# Patient Record
Sex: FEMALE | Race: WHITE | Employment: OTHER | URBAN - METROPOLITAN AREA
[De-identification: names, ages, dates, MRNs, and addresses within clinical notes are randomized per-mention and may not be internally consistent; named-entity substitution may affect disease eponyms.]

---

## 2017-01-23 ENCOUNTER — HOSPITAL ENCOUNTER (OUTPATIENT)
Dept: GENERAL RADIOLOGY | Facility: HOSPITAL | Age: 72
Discharge: HOME | End: 2017-01-23
Attending: INTERNAL MEDICINE
Payer: MEDICARE

## 2017-01-23 DIAGNOSIS — R05.9 COUGH: ICD-10-CM

## 2017-01-23 DIAGNOSIS — F17.200 SMOKER: ICD-10-CM

## 2017-01-23 PROCEDURE — 71020 XR CHEST PA + LAT CHEST (CPT=71020): CPT

## 2017-04-03 PROBLEM — H43.811 POSTERIOR VITREOUS DETACHMENT, RIGHT: Status: ACTIVE | Noted: 2017-04-03

## 2017-04-03 PROBLEM — H25.13 NUCLEAR SCLEROTIC CATARACT, BILATERAL: Status: ACTIVE | Noted: 2017-04-03

## 2017-04-03 PROBLEM — H26.9 CORTICAL CATARACT OF BOTH EYES: Status: ACTIVE | Noted: 2017-04-03

## 2018-02-26 ENCOUNTER — HOSPITAL ENCOUNTER (OUTPATIENT)
Dept: GENERAL RADIOLOGY | Facility: HOSPITAL | Age: 73
Discharge: HOME | End: 2018-02-26
Attending: INTERNAL MEDICINE
Payer: MEDICARE

## 2018-02-26 ENCOUNTER — LAB ENCOUNTER (OUTPATIENT)
Dept: LAB | Facility: HOSPITAL | Age: 73
End: 2018-02-26
Attending: INTERNAL MEDICINE
Payer: MEDICARE

## 2018-02-26 DIAGNOSIS — R05.9 COUGH: ICD-10-CM

## 2018-02-26 DIAGNOSIS — R05.9 COUGH: Primary | ICD-10-CM

## 2018-02-26 LAB — PROCALCITONIN SERPL-MCNC: 0.32 NG/ML (ref ?–0.11)

## 2018-02-26 PROCEDURE — 71046 X-RAY EXAM CHEST 2 VIEWS: CPT | Performed by: INTERNAL MEDICINE

## 2018-02-26 PROCEDURE — 36415 COLL VENOUS BLD VENIPUNCTURE: CPT

## 2018-02-26 PROCEDURE — 84145 PROCALCITONIN (PCT): CPT

## 2018-07-02 PROBLEM — H25.13 NUCLEAR SCLEROTIC CATARACT, BILATERAL: Status: RESOLVED | Noted: 2017-04-03 | Resolved: 2018-07-02

## 2018-07-02 PROBLEM — H43.813 POSTERIOR VITREOUS DETACHMENT OF BOTH EYES: Status: ACTIVE | Noted: 2018-07-02

## 2018-07-02 PROBLEM — Z96.1 PSEUDOPHAKIA OF BOTH EYES: Status: ACTIVE | Noted: 2018-07-02

## 2018-07-02 PROBLEM — H26.9 CORTICAL CATARACT OF BOTH EYES: Status: RESOLVED | Noted: 2017-04-03 | Resolved: 2018-07-02

## 2018-07-02 PROBLEM — H43.811 POSTERIOR VITREOUS DETACHMENT, RIGHT: Status: RESOLVED | Noted: 2017-04-03 | Resolved: 2018-07-02

## 2018-09-26 ENCOUNTER — RT VISIT (OUTPATIENT)
Dept: RESPIRATORY THERAPY | Facility: HOSPITAL | Age: 73
End: 2018-09-26
Attending: INTERNAL MEDICINE
Payer: MEDICARE

## 2018-09-26 DIAGNOSIS — R06.00 DOE (DYSPNEA ON EXERTION): ICD-10-CM

## 2018-09-26 PROCEDURE — 94726 PLETHYSMOGRAPHY LUNG VOLUMES: CPT

## 2018-09-26 PROCEDURE — 94060 EVALUATION OF WHEEZING: CPT

## 2018-09-26 PROCEDURE — 94729 DIFFUSING CAPACITY: CPT

## 2018-09-28 ENCOUNTER — HOSPITAL ENCOUNTER (OUTPATIENT)
Dept: CV DIAGNOSTICS | Facility: HOSPITAL | Age: 73
Discharge: HOME | End: 2018-09-28
Attending: INTERNAL MEDICINE
Payer: MEDICARE

## 2018-09-28 ENCOUNTER — HOSPITAL ENCOUNTER (OUTPATIENT)
Dept: GENERAL RADIOLOGY | Facility: HOSPITAL | Age: 73
Discharge: HOME | End: 2018-09-28
Attending: INTERNAL MEDICINE
Payer: MEDICARE

## 2018-09-28 DIAGNOSIS — R06.00 DOE (DYSPNEA ON EXERTION): ICD-10-CM

## 2018-09-28 DIAGNOSIS — R09.89 RESPIRATORY CRACKLES AT RIGHT LUNG BASE: ICD-10-CM

## 2018-09-28 DIAGNOSIS — R01.1 MURMUR, CARDIAC: ICD-10-CM

## 2018-09-28 DIAGNOSIS — R60.9 EDEMA: ICD-10-CM

## 2018-09-28 DIAGNOSIS — R06.02 SOB (SHORTNESS OF BREATH): ICD-10-CM

## 2018-09-28 PROCEDURE — 93306 TTE W/DOPPLER COMPLETE: CPT | Performed by: INTERNAL MEDICINE

## 2018-09-28 PROCEDURE — 71046 X-RAY EXAM CHEST 2 VIEWS: CPT | Performed by: INTERNAL MEDICINE

## 2018-09-28 NOTE — PROCEDURES
Vin Jiménez is a 68-year-old  female who stands 5 feet 5 inches tall and weighs 137 pounds. She underwent standard pulmonary function testing on 9/26/18. She carries a diagnosis of \"dyspnea on exertion. \"  She admits to a [id number] smoking his

## 2019-01-15 ENCOUNTER — HOSPITAL ENCOUNTER (OUTPATIENT)
Dept: MAMMOGRAPHY | Age: 74
Discharge: HOME | End: 2019-01-15
Attending: INTERNAL MEDICINE
Payer: MEDICARE

## 2019-01-15 DIAGNOSIS — Z12.31 ENCOUNTER FOR SCREENING MAMMOGRAM FOR MALIGNANT NEOPLASM OF BREAST: ICD-10-CM

## 2019-01-15 PROCEDURE — 77067 SCR MAMMO BI INCL CAD: CPT | Performed by: INTERNAL MEDICINE

## 2019-01-15 PROCEDURE — 77063 BREAST TOMOSYNTHESIS BI: CPT | Performed by: INTERNAL MEDICINE

## 2019-03-04 ENCOUNTER — APPOINTMENT (OUTPATIENT)
Dept: CT IMAGING | Facility: HOSPITAL | Age: 74
DRG: 139 | End: 2019-03-04
Attending: EMERGENCY MEDICINE
Payer: MEDICARE

## 2019-03-04 ENCOUNTER — HOSPITAL ENCOUNTER (INPATIENT)
Facility: HOSPITAL | Age: 74
LOS: 1 days | Discharge: HOME | DRG: 139 | End: 2019-03-07
Attending: EMERGENCY MEDICINE | Admitting: INTERNAL MEDICINE
Payer: MEDICARE

## 2019-03-04 DIAGNOSIS — L02.11 NECK ABSCESS: ICD-10-CM

## 2019-03-04 DIAGNOSIS — K11.3 ABSCESS OF PAROTID GLAND: Primary | ICD-10-CM

## 2019-03-04 DIAGNOSIS — K11.21 ACUTE PAROTITIS: ICD-10-CM

## 2019-03-04 LAB
ALBUMIN SERPL-MCNC: 4.2 G/DL (ref 3.4–5)
ALBUMIN/GLOB SERPL: 1.5 {RATIO} (ref 1–2)
ALP LIVER SERPL-CCNC: 84 U/L (ref 55–142)
ALT SERPL-CCNC: 17 U/L (ref 13–56)
ANION GAP SERPL CALC-SCNC: 10 MMOL/L (ref 0–18)
APTT PPP: 29.3 SECONDS (ref 26.1–34.6)
AST SERPL-CCNC: 16 U/L (ref 15–37)
BASOPHILS # BLD AUTO: 0.03 X10(3) UL (ref 0–0.2)
BASOPHILS NFR BLD AUTO: 0.3 %
BILIRUB SERPL-MCNC: 0.9 MG/DL (ref 0.1–2)
BUN BLD-MCNC: 10 MG/DL (ref 7–18)
BUN/CREAT SERPL: 16.1 (ref 10–20)
CALCIUM BLD-MCNC: 9.1 MG/DL (ref 8.5–10.1)
CHLORIDE SERPL-SCNC: 107 MMOL/L (ref 98–107)
CO2 SERPL-SCNC: 25 MMOL/L (ref 21–32)
CREAT BLD-MCNC: 0.62 MG/DL (ref 0.55–1.02)
DEPRECATED RDW RBC AUTO: 43.3 FL (ref 35.1–46.3)
EOSINOPHIL # BLD AUTO: 0.1 X10(3) UL (ref 0–0.7)
EOSINOPHIL NFR BLD AUTO: 0.9 %
ERYTHROCYTE [DISTWIDTH] IN BLOOD BY AUTOMATED COUNT: 11.9 % (ref 11–15)
GLOBULIN PLAS-MCNC: 2.8 G/DL (ref 2.8–4.4)
GLUCOSE BLD-MCNC: 90 MG/DL (ref 70–99)
HCT VFR BLD AUTO: 43.2 % (ref 35–48)
HGB BLD-MCNC: 14.6 G/DL (ref 12–16)
IMM GRANULOCYTES # BLD AUTO: 0.03 X10(3) UL (ref 0–1)
IMM GRANULOCYTES NFR BLD: 0.3 %
INR BLD: 0.97 (ref 0.9–1.1)
LYMPHOCYTES # BLD AUTO: 1.58 X10(3) UL (ref 1–4)
LYMPHOCYTES NFR BLD AUTO: 14.5 %
M PROTEIN MFR SERPL ELPH: 7 G/DL (ref 6.4–8.2)
MCH RBC QN AUTO: 33 PG (ref 26–34)
MCHC RBC AUTO-ENTMCNC: 33.8 G/DL (ref 31–37)
MCV RBC AUTO: 97.7 FL (ref 80–100)
MONOCYTES # BLD AUTO: 0.74 X10(3) UL (ref 0.1–1)
MONOCYTES NFR BLD AUTO: 6.8 %
NEUTROPHILS # BLD AUTO: 8.39 X10 (3) UL (ref 1.5–7.7)
NEUTROPHILS # BLD AUTO: 8.39 X10(3) UL (ref 1.5–7.7)
NEUTROPHILS NFR BLD AUTO: 77.2 %
OSMOLALITY SERPL CALC.SUM OF ELEC: 293 MOSM/KG (ref 275–295)
PLATELET # BLD AUTO: 164 10(3)UL (ref 150–450)
POTASSIUM SERPL-SCNC: 3.7 MMOL/L (ref 3.5–5.1)
PSA SERPL DL<=0.01 NG/ML-MCNC: 13.3 SECONDS (ref 12.5–14.7)
RBC # BLD AUTO: 4.42 X10(6)UL (ref 3.8–5.3)
SODIUM SERPL-SCNC: 142 MMOL/L (ref 136–145)
WBC # BLD AUTO: 10.9 X10(3) UL (ref 4–11)

## 2019-03-04 PROCEDURE — 99285 EMERGENCY DEPT VISIT HI MDM: CPT

## 2019-03-04 PROCEDURE — 86235 NUCLEAR ANTIGEN ANTIBODY: CPT | Performed by: INTERNAL MEDICINE

## 2019-03-04 PROCEDURE — 87040 BLOOD CULTURE FOR BACTERIA: CPT | Performed by: EMERGENCY MEDICINE

## 2019-03-04 PROCEDURE — 70491 CT SOFT TISSUE NECK W/DYE: CPT | Performed by: EMERGENCY MEDICINE

## 2019-03-04 PROCEDURE — S0077 INJECTION, CLINDAMYCIN PHOSP: HCPCS | Performed by: EMERGENCY MEDICINE

## 2019-03-04 PROCEDURE — 96375 TX/PRO/DX INJ NEW DRUG ADDON: CPT

## 2019-03-04 PROCEDURE — 85730 THROMBOPLASTIN TIME PARTIAL: CPT | Performed by: EMERGENCY MEDICINE

## 2019-03-04 PROCEDURE — 96376 TX/PRO/DX INJ SAME DRUG ADON: CPT

## 2019-03-04 PROCEDURE — 36415 COLL VENOUS BLD VENIPUNCTURE: CPT

## 2019-03-04 PROCEDURE — 85025 COMPLETE CBC W/AUTO DIFF WBC: CPT | Performed by: EMERGENCY MEDICINE

## 2019-03-04 PROCEDURE — 85610 PROTHROMBIN TIME: CPT | Performed by: EMERGENCY MEDICINE

## 2019-03-04 PROCEDURE — 96365 THER/PROPH/DIAG IV INF INIT: CPT

## 2019-03-04 PROCEDURE — 86038 ANTINUCLEAR ANTIBODIES: CPT | Performed by: OTOLARYNGOLOGY

## 2019-03-04 PROCEDURE — 86225 DNA ANTIBODY NATIVE: CPT | Performed by: INTERNAL MEDICINE

## 2019-03-04 PROCEDURE — 80053 COMPREHEN METABOLIC PANEL: CPT | Performed by: EMERGENCY MEDICINE

## 2019-03-04 RX ORDER — HYDROMORPHONE HYDROCHLORIDE 1 MG/ML
1 INJECTION, SOLUTION INTRAMUSCULAR; INTRAVENOUS; SUBCUTANEOUS EVERY 30 MIN PRN
Status: DISCONTINUED | OUTPATIENT
Start: 2019-03-04 | End: 2019-03-05

## 2019-03-04 RX ORDER — ONDANSETRON 2 MG/ML
4 INJECTION INTRAMUSCULAR; INTRAVENOUS ONCE
Status: COMPLETED | OUTPATIENT
Start: 2019-03-04 | End: 2019-03-04

## 2019-03-04 RX ORDER — HYDROMORPHONE HYDROCHLORIDE 1 MG/ML
0.5 INJECTION, SOLUTION INTRAMUSCULAR; INTRAVENOUS; SUBCUTANEOUS EVERY 30 MIN PRN
Status: COMPLETED | OUTPATIENT
Start: 2019-03-04 | End: 2019-03-04

## 2019-03-04 RX ORDER — CLINDAMYCIN PHOSPHATE 600 MG/50ML
600 INJECTION INTRAVENOUS ONCE
Status: COMPLETED | OUTPATIENT
Start: 2019-03-04 | End: 2019-03-04

## 2019-03-04 NOTE — ED INITIAL ASSESSMENT (HPI)
Patient sent from Dr. Tong Silveira office due to mass left side of neck. Patient states she noticed a lump around 2/14 thought it was a lymph node. Last Thursday mass enlarged and has become red and painful. Difficult to sleep or lie on side.

## 2019-03-05 ENCOUNTER — ANESTHESIA EVENT (OUTPATIENT)
Dept: SURGERY | Facility: HOSPITAL | Age: 74
End: 2019-03-05

## 2019-03-05 ENCOUNTER — ANESTHESIA (OUTPATIENT)
Dept: SURGERY | Facility: HOSPITAL | Age: 74
End: 2019-03-05

## 2019-03-05 PROCEDURE — 87205 SMEAR GRAM STAIN: CPT | Performed by: OTOLARYNGOLOGY

## 2019-03-05 PROCEDURE — 94640 AIRWAY INHALATION TREATMENT: CPT

## 2019-03-05 PROCEDURE — 87070 CULTURE OTHR SPECIMN AEROBIC: CPT | Performed by: OTOLARYNGOLOGY

## 2019-03-05 PROCEDURE — 87077 CULTURE AEROBIC IDENTIFY: CPT | Performed by: OTOLARYNGOLOGY

## 2019-03-05 PROCEDURE — 87075 CULTR BACTERIA EXCEPT BLOOD: CPT | Performed by: OTOLARYNGOLOGY

## 2019-03-05 PROCEDURE — S0077 INJECTION, CLINDAMYCIN PHOSP: HCPCS | Performed by: SPECIALIST

## 2019-03-05 PROCEDURE — 87206 SMEAR FLUORESCENT/ACID STAI: CPT | Performed by: OTOLARYNGOLOGY

## 2019-03-05 PROCEDURE — 87102 FUNGUS ISOLATION CULTURE: CPT | Performed by: OTOLARYNGOLOGY

## 2019-03-05 PROCEDURE — 0C990ZZ DRAINAGE OF LEFT PAROTID GLAND, OPEN APPROACH: ICD-10-PCS | Performed by: OTOLARYNGOLOGY

## 2019-03-05 RX ORDER — ACETAMINOPHEN 650 MG/1
650 SUPPOSITORY RECTAL EVERY 6 HOURS PRN
Status: DISCONTINUED | OUTPATIENT
Start: 2019-03-05 | End: 2019-03-07

## 2019-03-05 RX ORDER — ONDANSETRON 2 MG/ML
4 INJECTION INTRAMUSCULAR; INTRAVENOUS AS NEEDED
Status: DISCONTINUED | OUTPATIENT
Start: 2019-03-05 | End: 2019-03-05 | Stop reason: HOSPADM

## 2019-03-05 RX ORDER — HYDROMORPHONE HYDROCHLORIDE 1 MG/ML
0.5 INJECTION, SOLUTION INTRAMUSCULAR; INTRAVENOUS; SUBCUTANEOUS EVERY 4 HOURS PRN
Status: DISCONTINUED | OUTPATIENT
Start: 2019-03-05 | End: 2019-03-05

## 2019-03-05 RX ORDER — NALOXONE HYDROCHLORIDE 0.4 MG/ML
80 INJECTION, SOLUTION INTRAMUSCULAR; INTRAVENOUS; SUBCUTANEOUS AS NEEDED
Status: DISCONTINUED | OUTPATIENT
Start: 2019-03-05 | End: 2019-03-05 | Stop reason: HOSPADM

## 2019-03-05 RX ORDER — HYDROCODONE BITARTRATE AND ACETAMINOPHEN 5; 325 MG/1; MG/1
1 TABLET ORAL AS NEEDED
Status: DISCONTINUED | OUTPATIENT
Start: 2019-03-05 | End: 2019-03-05 | Stop reason: HOSPADM

## 2019-03-05 RX ORDER — ONDANSETRON 2 MG/ML
4 INJECTION INTRAMUSCULAR; INTRAVENOUS EVERY 4 HOURS PRN
Status: DISCONTINUED | OUTPATIENT
Start: 2019-03-05 | End: 2019-03-05

## 2019-03-05 RX ORDER — ATORVASTATIN CALCIUM 20 MG/1
20 TABLET, FILM COATED ORAL NIGHTLY
Status: DISCONTINUED | OUTPATIENT
Start: 2019-03-05 | End: 2019-03-07

## 2019-03-05 RX ORDER — HYDROCODONE BITARTRATE AND ACETAMINOPHEN 5; 325 MG/1; MG/1
2 TABLET ORAL AS NEEDED
Status: DISCONTINUED | OUTPATIENT
Start: 2019-03-05 | End: 2019-03-05 | Stop reason: HOSPADM

## 2019-03-05 RX ORDER — MIDAZOLAM HYDROCHLORIDE 1 MG/ML
1 INJECTION INTRAMUSCULAR; INTRAVENOUS EVERY 5 MIN PRN
Status: DISCONTINUED | OUTPATIENT
Start: 2019-03-05 | End: 2019-03-05 | Stop reason: HOSPADM

## 2019-03-05 RX ORDER — LABETALOL HYDROCHLORIDE 5 MG/ML
5 INJECTION, SOLUTION INTRAVENOUS EVERY 5 MIN PRN
Status: DISCONTINUED | OUTPATIENT
Start: 2019-03-05 | End: 2019-03-05 | Stop reason: HOSPADM

## 2019-03-05 RX ORDER — ALBUTEROL SULFATE 90 UG/1
2 AEROSOL, METERED RESPIRATORY (INHALATION) EVERY 4 HOURS PRN
Status: DISCONTINUED | OUTPATIENT
Start: 2019-03-05 | End: 2019-03-07

## 2019-03-05 RX ORDER — SERTRALINE HYDROCHLORIDE 25 MG/1
25 TABLET, FILM COATED ORAL DAILY
Status: DISCONTINUED | OUTPATIENT
Start: 2019-03-05 | End: 2019-03-07

## 2019-03-05 RX ORDER — LIDOCAINE HYDROCHLORIDE AND EPINEPHRINE 10; 10 MG/ML; UG/ML
INJECTION, SOLUTION INFILTRATION; PERINEURAL AS NEEDED
Status: DISCONTINUED | OUTPATIENT
Start: 2019-03-05 | End: 2019-03-05 | Stop reason: HOSPADM

## 2019-03-05 RX ORDER — SODIUM CHLORIDE, SODIUM LACTATE, POTASSIUM CHLORIDE, CALCIUM CHLORIDE 600; 310; 30; 20 MG/100ML; MG/100ML; MG/100ML; MG/100ML
INJECTION, SOLUTION INTRAVENOUS CONTINUOUS
Status: DISCONTINUED | OUTPATIENT
Start: 2019-03-05 | End: 2019-03-05 | Stop reason: HOSPADM

## 2019-03-05 RX ORDER — SODIUM CHLORIDE 9 MG/ML
INJECTION, SOLUTION INTRAVENOUS CONTINUOUS
Status: DISCONTINUED | OUTPATIENT
Start: 2019-03-05 | End: 2019-03-05

## 2019-03-05 RX ORDER — CLINDAMYCIN PHOSPHATE 600 MG/50ML
600 INJECTION INTRAVENOUS EVERY 8 HOURS
Status: DISCONTINUED | OUTPATIENT
Start: 2019-03-05 | End: 2019-03-07

## 2019-03-05 RX ORDER — HYDROMORPHONE HYDROCHLORIDE 1 MG/ML
INJECTION, SOLUTION INTRAMUSCULAR; INTRAVENOUS; SUBCUTANEOUS
Status: COMPLETED
Start: 2019-03-05 | End: 2019-03-05

## 2019-03-05 RX ORDER — ONDANSETRON 2 MG/ML
4 INJECTION INTRAMUSCULAR; INTRAVENOUS EVERY 4 HOURS PRN
Status: DISCONTINUED | OUTPATIENT
Start: 2019-03-05 | End: 2019-03-07

## 2019-03-05 RX ORDER — HYDROMORPHONE HYDROCHLORIDE 1 MG/ML
0.4 INJECTION, SOLUTION INTRAMUSCULAR; INTRAVENOUS; SUBCUTANEOUS EVERY 5 MIN PRN
Status: DISCONTINUED | OUTPATIENT
Start: 2019-03-05 | End: 2019-03-05 | Stop reason: HOSPADM

## 2019-03-05 RX ORDER — ACETAMINOPHEN 325 MG/1
650 TABLET ORAL EVERY 6 HOURS PRN
Status: DISCONTINUED | OUTPATIENT
Start: 2019-03-05 | End: 2019-03-07

## 2019-03-05 RX ORDER — MORPHINE SULFATE 4 MG/ML
2 INJECTION, SOLUTION INTRAMUSCULAR; INTRAVENOUS EVERY 5 MIN PRN
Status: DISCONTINUED | OUTPATIENT
Start: 2019-03-05 | End: 2019-03-05 | Stop reason: HOSPADM

## 2019-03-05 RX ORDER — GARLIC EXTRACT 500 MG
1 CAPSULE ORAL 2 TIMES DAILY
Status: DISCONTINUED | OUTPATIENT
Start: 2019-03-05 | End: 2019-03-07

## 2019-03-05 RX ORDER — MEPERIDINE HYDROCHLORIDE 25 MG/ML
12.5 INJECTION INTRAMUSCULAR; INTRAVENOUS; SUBCUTANEOUS AS NEEDED
Status: DISCONTINUED | OUTPATIENT
Start: 2019-03-05 | End: 2019-03-05 | Stop reason: HOSPADM

## 2019-03-05 RX ORDER — HYDROMORPHONE HYDROCHLORIDE 1 MG/ML
0.5 INJECTION, SOLUTION INTRAMUSCULAR; INTRAVENOUS; SUBCUTANEOUS EVERY 2 HOUR PRN
Status: DISCONTINUED | OUTPATIENT
Start: 2019-03-05 | End: 2019-03-07

## 2019-03-05 RX ORDER — SODIUM CHLORIDE 9 MG/ML
INJECTION, SOLUTION INTRAVENOUS CONTINUOUS
Status: DISCONTINUED | OUTPATIENT
Start: 2019-03-05 | End: 2019-03-07

## 2019-03-05 RX ORDER — HYDROMORPHONE HYDROCHLORIDE 1 MG/ML
0.5 INJECTION, SOLUTION INTRAMUSCULAR; INTRAVENOUS; SUBCUTANEOUS EVERY 30 MIN PRN
Status: DISCONTINUED | OUTPATIENT
Start: 2019-03-05 | End: 2019-03-05

## 2019-03-05 RX ORDER — HYDROMORPHONE HYDROCHLORIDE 1 MG/ML
1 INJECTION, SOLUTION INTRAMUSCULAR; INTRAVENOUS; SUBCUTANEOUS EVERY 2 HOUR PRN
Status: DISCONTINUED | OUTPATIENT
Start: 2019-03-05 | End: 2019-03-07

## 2019-03-05 NOTE — H&P
[facility] Patient Status:  Inpatient    1945 MRN [id number]   [facility] 3NW-A Attending Ronald Davis MD   Hosp Day # 1 PCP Vandana Jc MD     History of Present Illness:  Sinmi smoking. She smoked 1.00 pack per day. she has never used smokeless tobacco. She reports that she drinks about 8.4 oz of alcohol per week. She reports that she does not use drugs.     Allergies:    Augmentin [Amoxicil*    ANAPHYLAXIS  Penicillins INTACT. PSYCHIATRIC: AFFECT/MOOD/CONVERSATION APPROPRIATE/NORMAL.           Laboratory Data:   Lab Results   Component Value Date    WBC 10.9 03/04/2019    HGB 14.6 03/04/2019    HCT 43.2 03/04/2019    .0 03/04/2019    CREATSERUM 0.62 03/04/2019 Active Problem List:     Ganglion of wrist, right     Pseudophakia of both eyes     Posterior vitreous detachment of both eyes     CARRIZALES (dyspnea on exertion)     Abscess of parotid gland     Acute parotitis          Plan  Acute Parotid abscess–IV clindamyci

## 2019-03-05 NOTE — ANESTHESIA PREPROCEDURE EVALUATION
PRE-OP EVALUATION    Patient Name: Arvin Gonzales Ace    Pre-op Diagnosis: Neck abscess [L02.11]    Procedure(s):  INCISION AND DRAINAGE LEFT NECK ABSCESS    Surgeon(s) and Role:     Kamryn Solano MD - Primary    Pre-op vitals reviewed.   Temp: 100.1 °F (37 Disp:  Rfl:        Allergies: Augmentin [Amoxicillin-Pot Clavulanate]; Penicillins; Keflex [Cephalexin]      Anesthesia Evaluation    Patient summary reviewed.     Anesthetic Complications  (-) history of anesthetic complications         GI/Hepatic/Renal 03/04/2019         Airway      Mallampati: II  Mouth opening: >3 FB  TM distance: 4 - 6 cm  Neck ROM: full Cardiovascular    Cardiovascular exam normal.         Dental    No notable dental history.          Pulmonary    Pulmonary exam normal.

## 2019-03-05 NOTE — CONSULTS
73 yo female with swelling in left cheek for almost 3 weeks. It got significantly more tender and swollen over the weekend. She complains of dry mouth.     [facility]    Report of Consultation    Edward Gamez Ace Patient Status:  Inpatient     clindamycin in D5W (CLEOCIN) premix 600mg/50ml, 600 mg, Intravenous, Q8H  •  0.9%  NaCl infusion, , Intravenous, Continuous  •  HYDROmorphone HCl (DILAUDID) 1 MG/ML injection 0.5 mg, 0.5 mg, Intravenous, Q2H PRN **OR** HYDROmorphone HCl (DILAUDID) 1 MG/ML no organomegaly   Genitalia:      Rectal:      Extremities:   Extremities normal, atraumatic, no cyanosis or edema   Pulses:   2+ and symmetric all extremities   Skin:   Skin color, texture, turgor normal, no rashes or lesions   Lymph nodes:   Supraclavicu

## 2019-03-05 NOTE — PLAN OF CARE
NURSING ADMISSION NOTE      Patient admitted via Cart  Oriented to room. Safety precautions initiated. Bed in low position. Call light in reach. Received patient awake and oriented. On O2 at 2l, breath sounds clear.  Stated pain level 7/10, medicate

## 2019-03-05 NOTE — PROGRESS NOTES
Patient has temperature of 100.4 F. No orders for Tylenol. Dr. Khari Rees paged at this time. Orders received for Tylenol suppository pre-op due to NPO status. Order also received for PO Tylenol if needed post-operatively. Will carry out orders.      1300 - d

## 2019-03-05 NOTE — ED NOTES
Patient taken to CT department at this time by cart by PCT. Remains updated with plan of care. Reports pain slightly decreased.

## 2019-03-05 NOTE — PROGRESS NOTES
Upon initial rounds patient states that pain has not been managed all night. Was receiving 1 mg of Dilaudid Q30 minutes in ER. Floor order for 0.5 mg of Dilaudid Q4H. Dr. Laurie Germain paged at this time for new orders.      [id number] - call received from dr. Laurie Germain,

## 2019-03-05 NOTE — PROGRESS NOTES
Patient left for OR at this time. NPO since this AM. IV fluids infusing.  at the bedside. Consent on chart, unsigned, patient wishing to speak with Dr. Yi James again prior to signing. All clothing and jewelry removed. Dentures removed.  Patient left uni

## 2019-03-06 LAB
ANA SCREEN: POSITIVE
BASOPHILS # BLD AUTO: 0.02 X10(3) UL (ref 0–0.2)
BASOPHILS NFR BLD AUTO: 0.2 %
DEPRECATED RDW RBC AUTO: 43.6 FL (ref 35.1–46.3)
EOSINOPHIL # BLD AUTO: 0 X10(3) UL (ref 0–0.7)
EOSINOPHIL NFR BLD AUTO: 0 %
ERYTHROCYTE [DISTWIDTH] IN BLOOD BY AUTOMATED COUNT: 11.9 % (ref 11–15)
HCT VFR BLD AUTO: 34.5 % (ref 35–48)
HGB BLD-MCNC: 11.7 G/DL (ref 12–16)
IMM GRANULOCYTES # BLD AUTO: 0.06 X10(3) UL (ref 0–1)
IMM GRANULOCYTES NFR BLD: 0.5 %
LYMPHOCYTES # BLD AUTO: 0.82 X10(3) UL (ref 1–4)
LYMPHOCYTES NFR BLD AUTO: 7.4 %
MCH RBC QN AUTO: 33.9 PG (ref 26–34)
MCHC RBC AUTO-ENTMCNC: 33.9 G/DL (ref 31–37)
MCV RBC AUTO: 100 FL (ref 80–100)
MONOCYTES # BLD AUTO: 0.43 X10(3) UL (ref 0.1–1)
MONOCYTES NFR BLD AUTO: 3.9 %
NEUTROPHILS # BLD AUTO: 9.75 X10 (3) UL (ref 1.5–7.7)
NEUTROPHILS # BLD AUTO: 9.75 X10(3) UL (ref 1.5–7.7)
NEUTROPHILS NFR BLD AUTO: 88 %
PLATELET # BLD AUTO: 134 10(3)UL (ref 150–450)
RBC # BLD AUTO: 3.45 X10(6)UL (ref 3.8–5.3)
WBC # BLD AUTO: 11.1 X10(3) UL (ref 4–11)

## 2019-03-06 PROCEDURE — 85025 COMPLETE CBC W/AUTO DIFF WBC: CPT | Performed by: OTOLARYNGOLOGY

## 2019-03-06 PROCEDURE — S0077 INJECTION, CLINDAMYCIN PHOSP: HCPCS | Performed by: SPECIALIST

## 2019-03-06 RX ORDER — HYDROCODONE BITARTRATE AND ACETAMINOPHEN 5; 325 MG/1; MG/1
2 TABLET ORAL EVERY 6 HOURS PRN
Status: DISCONTINUED | OUTPATIENT
Start: 2019-03-06 | End: 2019-03-06

## 2019-03-06 RX ORDER — HYDROCODONE BITARTRATE AND ACETAMINOPHEN 5; 325 MG/1; MG/1
2 TABLET ORAL EVERY 6 HOURS PRN
Status: DISCONTINUED | OUTPATIENT
Start: 2019-03-06 | End: 2019-03-07

## 2019-03-06 RX ORDER — HYDROCODONE BITARTRATE AND ACETAMINOPHEN 5; 325 MG/1; MG/1
1 TABLET ORAL EVERY 6 HOURS PRN
Status: DISCONTINUED | OUTPATIENT
Start: 2019-03-06 | End: 2019-03-07

## 2019-03-06 RX ORDER — ENOXAPARIN SODIUM 100 MG/ML
40 INJECTION SUBCUTANEOUS DAILY
Status: DISCONTINUED | OUTPATIENT
Start: 2019-03-06 | End: 2019-03-07

## 2019-03-06 RX ORDER — HYDROCODONE BITARTRATE AND ACETAMINOPHEN 5; 325 MG/1; MG/1
1 TABLET ORAL EVERY 6 HOURS PRN
Status: DISCONTINUED | OUTPATIENT
Start: 2019-03-06 | End: 2019-03-06

## 2019-03-06 NOTE — PLAN OF CARE
Patient/Family Goals    • Patient/Family Long Term Goal Go home. Progressing    • Patient/Family Short Term Goal Pain control, monitor wound,  Progressing          Patient complained of pain treated with dilaudid x 2. No nausea. Tolerating regular diet.  V

## 2019-03-06 NOTE — BRIEF OP NOTE
Pre-Operative Diagnosis: Neck abscess [L02.11]     Post-Operative Diagnosis: Neck abscess [L02.11]      Procedure Performed:   Procedure(s):  INCISION AND DRAINAGE LEFT NECK ABSCESS    Surgeon(s) and Role:     Vreonica Ortiz MD - Primary    Assistant(s):

## 2019-03-06 NOTE — PAYOR COMM NOTE
--------------  ADMISSION REVIEW     Iraj Womack MA [facility]  Subscriber #:  [id number]  Authorization Number: [id number]    Admit date: 3/4/19  Admit time: 2305       Admitting Physician: Molly Kowalski MD  Attending Physician:  MD Nick Ontiveros Peter Rand MD at [facility]   • RAFFY BIOPSY STEREOTACTIC NODULE 1 SITE RIGHT      2015   • RIGHT PHACOEMULSIFICATION OF CATARACT WITH INTRAOCULAR LENS IMPLANT WITH ORA Right 4/26/2017    Performed by Narciso Dai MD at [facility], PTT, ACTIVATED - Normal   CBC WITH DIFFERENTIAL WITH PLATELET    Narrative: The following orders were created for panel order CBC WITH DIFFERENTIAL WITH PLATELET.   Procedure                               Abnormality         Status cervical lymph nodes are present which may be reactive. None appear frankly enlarged by CT criteria. Representative left jugulodigastric node measures 10 x 11 mm.       CONCLUSION:  Asymmetric enlargement of the left parotid gland with local inflammatory 3/4/2019 Unknown            Signed by Roopa Julien MD on 3/4/2019  9:45 PM            H&P - H&P Note      H&P signed by Venu Fischer MD at 3/5/2019  8:31 AM     Author:  Venu Fischer MD Service:  Internal Medicine Author Type:  Physician • RIGHT PHACOEMULSIFICATION OF CATARACT WITH INTRAOCULAR LENS IMPLANT WITH ORA Right 4/26/2017    Performed by Rakesh Hodge MD at [address] [place] [address]     Family History   Problem Relation Age of Onset   • Cancer Father    • Heart Disorder Father anterior and caudal to the left ear, about 8 cm in length, hot to touch, tender and red  THORAX: NO CVAT, INGUINAL OR AXILLARY LYMPHADENOPATHY  LUNGS: CLEAR; NO RHONCHI, RALES, WHEEZING  HEART: RRR, S1/S2.  NO MURMURS, RUBS, GALLOPS  ABDOMEN: SOFT, NT; NO H inflammatory changes. This may represent parotiditis. There is a rounded area of hypoattenuation within the left parotid gland which may represent early abscess or phlegmonous   change.   Likely reactive changes present within the soft tissues of the neck mg Intravenous Nahun He RN    3/5/2019 1531 Given 0.5 mg Intravenous Edy Chambers RN      HYDROmorphone HCl (DILAUDID) 1 MG/ML injection 1 mg     Date Action Dose Route User    3/6/2019 0111 Given 1 mg Intravenous Landon Pressley RN    3/5

## 2019-03-06 NOTE — PROGRESS NOTES
[facility]    Progress Note    Ruby Griffin Ace Patient Status:  Inpatient    1945 MRN [id number]   [facility] 3NW-A Attending Marian Ho MD   Hosp Day # 1 PCP Vcitor Manuel Feldman MD       SUBJECTIVE:  No CP, SOB, or N/V. Oral Daily   Umeclidinium Bromide (INCRUSE ELLIPTA) 62.5 MCG/INH inhaler 1 puff 1 puff Inhalation Daily   Albuterol Sulfate  (90 Base) MCG/ACT inhaler 2 puff 2 puff Inhalation Q4H PRN   acetaminophen (TYLENOL) 650 MG rectal suppository 650 mg 650 mg

## 2019-03-06 NOTE — ANESTHESIA POSTPROCEDURE EVALUATION
[facility] Patient Status:  Inpatient   Age/Gender 72year old female MRN [id number]   [facility] SURGERY Attending Fransisca Wilson MD   Hosp Day # 1 PCP Belle Davis MD       Anesthesia Post-op Note    Procedu

## 2019-03-06 NOTE — OPERATIVE REPORT
[address]    PATIENT'S NAME: Fátima WARD   ATTENDING PHYSICIAN: Lacy Arroyo M.D. OPERATING PHYSICIAN: Cassandra Trevino M.D.    PATIENT ACCOUNT#:   [de-identified]    LOCATION:  [address] [place] [address]  MEDICAL RECORD #:   [id number]       DATE OF BIRTH:

## 2019-03-07 VITALS
BODY MASS INDEX: 23.63 KG/M2 | RESPIRATION RATE: 18 BRPM | HEART RATE: 65 BPM | TEMPERATURE: 98 F | WEIGHT: 147 LBS | HEIGHT: 66 IN | SYSTOLIC BLOOD PRESSURE: 145 MMHG | DIASTOLIC BLOOD PRESSURE: 75 MMHG | OXYGEN SATURATION: 97 %

## 2019-03-07 LAB
CENTROMERE AUTOAB: <100 AU/ML (ref ?–100)
DSDNA AUTOAB: <100 IU/ML (ref ?–100)
HISTONE AUTOAB: <100 AU/ML (ref ?–100)
JO-1 AUTOAB: <100 AU/ML (ref ?–100)
RNP AUTOAB: 135 AU/ML (ref ?–100)
SCL-70 AUTOAB: <100 AU/ML (ref ?–100)
SM AUTOAB (SMITH): <100 AU/ML (ref ?–100)
SSA AUTOAB: <100 AU/ML (ref ?–100)
SSB AUTOAB: <100 AU/ML (ref ?–100)

## 2019-03-07 PROCEDURE — S0077 INJECTION, CLINDAMYCIN PHOSP: HCPCS | Performed by: SPECIALIST

## 2019-03-07 RX ORDER — HYDROCODONE BITARTRATE AND ACETAMINOPHEN 5; 325 MG/1; MG/1
2 TABLET ORAL EVERY 6 HOURS PRN
Qty: 40 TABLET | Refills: 0 | Status: SHIPPED | OUTPATIENT
Start: 2019-03-07

## 2019-03-07 RX ORDER — CLINDAMYCIN HYDROCHLORIDE 300 MG/1
300 CAPSULE ORAL 3 TIMES DAILY
Qty: 30 CAPSULE | Refills: 0 | Status: SHIPPED | OUTPATIENT
Start: 2019-03-07 | End: 2019-03-17

## 2019-03-07 NOTE — PROGRESS NOTES
[facility]    Progress Note    Kristina Cooley Ace Patient Status:  Inpatient    1945 MRN [id number]   [facility] 3NW-A Attending Tanya Love MD   Hosp Day # 1 PCP Cooper Renteria MD       SUBJECTIVE:  No CP, SOB, or N/V. 0.5 mg Intravenous Q2H PRN   Or      HYDROmorphone HCl (DILAUDID) 1 MG/ML injection 1 mg 1 mg Intravenous Q2H PRN   Acidophilus/Pectin (PROBIOTIC) CAPS 1 capsule 1 capsule Oral BID   atorvastatin (LIPITOR) tab 20 mg 20 mg Oral Nightly   Sertraline HCl (ZOL

## 2019-03-07 NOTE — PLAN OF CARE
Problem: PAIN - ADULT  Goal: Verbalizes/displays adequate comfort level or patient's stated pain goal  INTERVENTIONS:  - Encourage pt to monitor pain and request assistance  - Assess pain using appropriate pain scale  - Administer analgesics based on type deficits noted. Patient will maintain oxygen saturation at/above 93% on room air. Patient updated on plan of care and verbalizes understanding. Patient refusing to wear SCD's, writing RN explained the benefits of wearing SCD's to the patient and the risks as

## 2019-03-07 NOTE — PLAN OF CARE
Patient/Family Goals    • Patient/Family Long Term Goal Go home. Progressing    • Patient/Family Short Term Goal Pain control, monitor incision,  Progressing          Patient requested Dilaudid x 2, and stated after second dose she would start norcos.  Leilani Hernandez

## 2019-03-07 NOTE — PROGRESS NOTES
Tolerating diet. States feels ready to go home. Written and verbal discharge instructions given to patient, verbalize understanding. IV discontinued in RW, angio-cath tip intact, site free from redness, swelling, or drainage, patient denies pain at site.

## 2019-03-07 NOTE — PROGRESS NOTES
Generally sore. Afeb  VSS  Markedly less pain and swelling. YUKI+  Culture still pending. Home today on clindamycin. FU 1 Week.

## 2019-03-09 NOTE — DISCHARGE SUMMARY
[facility]  Discharge Summary    Sirisha Rosales Ace Patient Status:  Inpatient    1945 MRN [id number]   [facility] 3NW-A Attending No att. providers found   Hosp Day # 1 PCP Nickolas Easton MD     Date of Admission: 3/4/2019    Da R-0    FLORASTOR 250 MG Oral Cap  Historical, R-1, CONNIE    Sertraline HCl 25 MG Oral Tab  Take 25 mg by mouth daily. , Historical    !! - Potential duplicate medications found. Please discuss with provider. Follow up Visits:  Follow-up with PMD  in 1

## 2020-10-20 ENCOUNTER — LAB ENCOUNTER (OUTPATIENT)
Dept: LAB | Facility: HOSPITAL | Age: 75
End: 2020-10-20
Attending: INTERNAL MEDICINE
Payer: MEDICARE

## 2020-10-20 DIAGNOSIS — R73.01 IMPAIRED FASTING GLUCOSE: ICD-10-CM

## 2020-10-20 DIAGNOSIS — R53.83 FATIGUE: ICD-10-CM

## 2020-10-20 DIAGNOSIS — E78.2 MIXED HYPERLIPIDEMIA: Primary | ICD-10-CM

## 2020-10-20 DIAGNOSIS — Z79.899 POLYPHARMACY: ICD-10-CM

## 2020-10-20 PROCEDURE — 83036 HEMOGLOBIN GLYCOSYLATED A1C: CPT

## 2020-10-20 PROCEDURE — 80053 COMPREHEN METABOLIC PANEL: CPT

## 2020-10-20 PROCEDURE — 80061 LIPID PANEL: CPT

## 2020-10-20 PROCEDURE — 84443 ASSAY THYROID STIM HORMONE: CPT

## 2020-10-20 PROCEDURE — 36415 COLL VENOUS BLD VENIPUNCTURE: CPT

## 2020-10-20 PROCEDURE — 84439 ASSAY OF FREE THYROXINE: CPT

## 2020-10-20 PROCEDURE — 85025 COMPLETE CBC W/AUTO DIFF WBC: CPT

## 2021-03-15 DIAGNOSIS — Z23 NEED FOR VACCINATION: ICD-10-CM

## 2021-04-29 ENCOUNTER — ORDER TRANSCRIPTION (OUTPATIENT)
Dept: ADMINISTRATIVE | Facility: HOSPITAL | Age: 76
End: 2021-04-29

## 2021-04-29 DIAGNOSIS — Z01.812 PRE-PROCEDURE LAB EXAM: ICD-10-CM

## 2021-04-29 DIAGNOSIS — Z20.822 ENCOUNTER FOR LABORATORY TESTING FOR COVID-19 VIRUS: Primary | ICD-10-CM

## 2021-07-08 ENCOUNTER — HOSPITAL ENCOUNTER (OUTPATIENT)
Dept: MAMMOGRAPHY | Age: 76
Discharge: HOME | End: 2021-07-08
Attending: INTERNAL MEDICINE
Payer: MEDICARE

## 2021-07-08 DIAGNOSIS — Z12.31 ENCOUNTER FOR SCREENING MAMMOGRAM FOR MALIGNANT NEOPLASM OF BREAST: ICD-10-CM

## 2021-07-08 PROCEDURE — 77063 BREAST TOMOSYNTHESIS BI: CPT | Performed by: INTERNAL MEDICINE

## 2021-07-08 PROCEDURE — 77067 SCR MAMMO BI INCL CAD: CPT | Performed by: INTERNAL MEDICINE

## 2021-12-08 ENCOUNTER — LAB ENCOUNTER (OUTPATIENT)
Dept: LAB | Facility: HOSPITAL | Age: 76
End: 2021-12-08
Attending: INTERNAL MEDICINE
Payer: MEDICARE

## 2021-12-08 DIAGNOSIS — Z20.822 ENCOUNTER FOR LABORATORY TESTING FOR COVID-19 VIRUS: ICD-10-CM

## 2021-12-08 DIAGNOSIS — Z01.812 PRE-PROCEDURE LAB EXAM: ICD-10-CM

## 2021-12-11 ENCOUNTER — HOSPITAL ENCOUNTER (OUTPATIENT)
Dept: GENERAL RADIOLOGY | Facility: HOSPITAL | Age: 76
Discharge: HOME | End: 2021-12-11
Attending: INTERNAL MEDICINE
Payer: MEDICARE

## 2021-12-11 DIAGNOSIS — R13.10 DYSPHAGIA: ICD-10-CM

## 2021-12-11 PROCEDURE — 74240 X-RAY XM UPR GI TRC 1CNTRST: CPT | Performed by: INTERNAL MEDICINE

## 2021-12-11 NOTE — ED PROVIDER NOTES
Patient Seen in: [facility] Emergency Department    History   Patient presents with:  Abscess (integumentary)    Stated Complaint: left ear abscesss - sent from PCP, need i&d    HPI    Patient presents with left neck swelling.   The patient states th and vital signs reviewed. All other systems reviewed and negative except as noted above.     Physical Exam     ED Triage Vitals [03/04/19 1538]   BP (!) 167/93   Pulse 84   Resp 16   Temp 98.3 °F (36.8 °C)   Temp src Oral   SpO2 97 %   O2 Device None ( Tissue Of Neck(contrast Only) (cpt=70491)    Result Date: 3/4/2019  PROCEDURE:  CT SOFT TISSUE OF NECK(CONTRAST ONLY) (CPT=70491)  COMPARISON:  None.   INDICATIONS:  left neck mass - sent from PCP, need i&d  TECHNIQUE:  IV contrast-enhanced multislice CT sc HYDROmorphone HCl (DILAUDID) 1 MG/ML injection 1 mg (not administered)   HYDROmorphone HCl (DILAUDID) 1 MG/ML injection 0.5 mg (0.5 mg Intravenous Given 3/4/19 2007)   ondansetron HCl (ZOFRAN) injection 4 mg (4 mg Intravenous Given 3/4/19 1654)   iohexol BMI: BMI (kg/m2): 31.6 (11-29-21 @ 16:35)  HbA1c: A1C with Estimated Average Glucose Result: 5.4 % (11-04-21 @ 06:58)    Glucose: POCT Blood Glucose.: 109 mg/dL (11-12-21 @ 16:13)    BP: --  Lipid Panel:

## 2023-09-22 ENCOUNTER — ORDER TRANSCRIPTION (OUTPATIENT)
Dept: ADMINISTRATIVE | Facility: HOSPITAL | Age: 78
End: 2023-09-22

## 2023-09-22 DIAGNOSIS — Z13.6 SCREENING FOR CARDIOVASCULAR CONDITION: Primary | ICD-10-CM

## 2023-10-15 ENCOUNTER — ORDER TRANSCRIPTION (OUTPATIENT)
Dept: ADMINISTRATIVE | Facility: HOSPITAL | Age: 78
End: 2023-10-15

## 2023-10-15 DIAGNOSIS — Z13.6 SCREENING FOR CARDIOVASCULAR CONDITION: Primary | ICD-10-CM

## 2023-10-16 ENCOUNTER — HOSPITAL ENCOUNTER (OUTPATIENT)
Dept: CT IMAGING | Facility: HOSPITAL | Age: 78
End: 2023-10-16
Attending: INTERNAL MEDICINE

## 2023-10-16 ENCOUNTER — EKG ENCOUNTER (OUTPATIENT)
Dept: LAB | Facility: HOSPITAL | Age: 78
End: 2023-10-16
Attending: INTERNAL MEDICINE
Payer: MEDICARE

## 2023-10-16 VITALS
HEIGHT: 65 IN | WEIGHT: 157 LBS | BODY MASS INDEX: 26.16 KG/M2 | DIASTOLIC BLOOD PRESSURE: 80 MMHG | SYSTOLIC BLOOD PRESSURE: 110 MMHG

## 2023-10-16 DIAGNOSIS — Z13.6 SCREENING FOR CARDIOVASCULAR CONDITION: ICD-10-CM

## 2023-10-16 DIAGNOSIS — R06.09 DOE (DYSPNEA ON EXERTION): ICD-10-CM

## 2023-10-16 DIAGNOSIS — R53.83 FATIGUE: Primary | ICD-10-CM

## 2023-10-16 LAB
ATRIAL RATE: 66 BPM
P AXIS: 4 DEGREES
P-R INTERVAL: 146 MS
Q-T INTERVAL: 446 MS
QRS DURATION: 76 MS
QTC CALCULATION (BEZET): 467 MS
R AXIS: 31 DEGREES
T AXIS: 15 DEGREES
VENTRICULAR RATE: 66 BPM

## 2023-10-16 PROCEDURE — 93005 ELECTROCARDIOGRAM TRACING: CPT

## 2023-10-16 PROCEDURE — 93010 ELECTROCARDIOGRAM REPORT: CPT | Performed by: INTERNAL MEDICINE

## 2023-10-27 ENCOUNTER — HOSPITAL ENCOUNTER (OUTPATIENT)
Dept: CV DIAGNOSTICS | Facility: HOSPITAL | Age: 78
Discharge: HOME | End: 2023-10-27
Attending: INTERNAL MEDICINE

## 2023-10-27 DIAGNOSIS — R06.09 DOE (DYSPNEA ON EXERTION): ICD-10-CM

## 2023-10-27 DIAGNOSIS — R94.31 ABNORMAL EKG: ICD-10-CM

## 2023-10-27 PROCEDURE — 93018 CV STRESS TEST I&R ONLY: CPT | Performed by: INTERNAL MEDICINE

## 2023-10-27 PROCEDURE — 93017 CV STRESS TEST TRACING ONLY: CPT | Performed by: INTERNAL MEDICINE

## 2023-11-13 ENCOUNTER — TELEPHONE (OUTPATIENT)
Dept: ORTHOPEDICS CLINIC | Facility: CLINIC | Age: 78
End: 2023-11-13

## 2023-11-13 DIAGNOSIS — S42.412A LEFT SUPRACONDYLAR HUMERUS FRACTURE, CLOSED, INITIAL ENCOUNTER: Primary | ICD-10-CM

## 2023-11-13 NOTE — TELEPHONE ENCOUNTER
Kelly Grant MD   to [facility] Clinical Pool       11/13/23 12:45 PM   Lets have her see Viviana Bhatti either tomorrow or Wednesday, thanks!

## 2023-11-13 NOTE — TELEPHONE ENCOUNTER
Patient scheduled with Sincer:    Future Appointments   Date Time Provider Pamela Engel   11/15/2023 11:20 AM MICKIE Mcgrath [facility] [id number]

## 2023-11-13 NOTE — TELEPHONE ENCOUNTER
Patient called for left humerus fx. No imaging in epic.  She stated it happened Friday while she was in [place]. Please advise if/when patient can be fit in and for imaging

## 2023-11-15 ENCOUNTER — HOSPITAL ENCOUNTER (OUTPATIENT)
Dept: GENERAL RADIOLOGY | Age: 78
Discharge: HOME | End: 2023-11-15
Attending: PHYSICIAN ASSISTANT
Payer: MEDICARE

## 2023-11-15 ENCOUNTER — OFFICE VISIT (OUTPATIENT)
Dept: ORTHOPEDICS CLINIC | Facility: CLINIC | Age: 78
End: 2023-11-15
Payer: COMMERCIAL

## 2023-11-15 VITALS — BODY MASS INDEX: 26.33 KG/M2 | WEIGHT: 158 LBS | HEIGHT: 65 IN

## 2023-11-15 DIAGNOSIS — S42.412A LEFT SUPRACONDYLAR HUMERUS FRACTURE, CLOSED, INITIAL ENCOUNTER: ICD-10-CM

## 2023-11-15 DIAGNOSIS — S42.292A OTHER CLOSED DISPLACED FRACTURE OF PROXIMAL END OF LEFT HUMERUS, INITIAL ENCOUNTER: Primary | ICD-10-CM

## 2023-11-15 PROCEDURE — 1159F MED LIST DOCD IN RCRD: CPT | Performed by: PHYSICIAN ASSISTANT

## 2023-11-15 PROCEDURE — 3008F BODY MASS INDEX DOCD: CPT | Performed by: PHYSICIAN ASSISTANT

## 2023-11-15 PROCEDURE — 73060 X-RAY EXAM OF HUMERUS: CPT | Performed by: PHYSICIAN ASSISTANT

## 2023-11-15 PROCEDURE — 99203 OFFICE O/P NEW LOW 30 MIN: CPT | Performed by: PHYSICIAN ASSISTANT

## 2023-11-15 PROCEDURE — 1160F RVW MEDS BY RX/DR IN RCRD: CPT | Performed by: PHYSICIAN ASSISTANT

## 2023-11-15 PROCEDURE — 1125F AMNT PAIN NOTED PAIN PRSNT: CPT | Performed by: PHYSICIAN ASSISTANT

## 2023-11-15 RX ORDER — HYDROCODONE BITARTRATE AND ACETAMINOPHEN 10; 325 MG/1; MG/1
TABLET ORAL
COMMUNITY
Start: 2023-11-13

## 2023-11-29 ENCOUNTER — OFFICE VISIT (OUTPATIENT)
Dept: ORTHOPEDICS CLINIC | Facility: CLINIC | Age: 78
End: 2023-11-29
Payer: COMMERCIAL

## 2023-11-29 ENCOUNTER — HOSPITAL ENCOUNTER (OUTPATIENT)
Dept: GENERAL RADIOLOGY | Age: 78
Discharge: HOME | End: 2023-11-29
Attending: PHYSICIAN ASSISTANT
Payer: MEDICARE

## 2023-11-29 DIAGNOSIS — S42.292A OTHER CLOSED DISPLACED FRACTURE OF PROXIMAL END OF LEFT HUMERUS, INITIAL ENCOUNTER: Primary | ICD-10-CM

## 2023-11-29 DIAGNOSIS — S42.292A OTHER CLOSED DISPLACED FRACTURE OF PROXIMAL END OF LEFT HUMERUS, INITIAL ENCOUNTER: ICD-10-CM

## 2023-11-29 PROCEDURE — 1160F RVW MEDS BY RX/DR IN RCRD: CPT | Performed by: PHYSICIAN ASSISTANT

## 2023-11-29 PROCEDURE — 99213 OFFICE O/P EST LOW 20 MIN: CPT | Performed by: PHYSICIAN ASSISTANT

## 2023-11-29 PROCEDURE — 73060 X-RAY EXAM OF HUMERUS: CPT | Performed by: PHYSICIAN ASSISTANT

## 2023-11-29 PROCEDURE — 1125F AMNT PAIN NOTED PAIN PRSNT: CPT | Performed by: PHYSICIAN ASSISTANT

## 2023-11-29 PROCEDURE — 1159F MED LIST DOCD IN RCRD: CPT | Performed by: PHYSICIAN ASSISTANT

## 2023-12-27 ENCOUNTER — TELEPHONE (OUTPATIENT)
Dept: ORTHOPEDICS CLINIC | Facility: CLINIC | Age: 78
End: 2023-12-27

## 2023-12-27 DIAGNOSIS — S42.412A LEFT SUPRACONDYLAR HUMERUS FRACTURE, CLOSED, INITIAL ENCOUNTER: Primary | ICD-10-CM

## 2024-01-01 ENCOUNTER — MED REC SCAN ONLY (OUTPATIENT)
Dept: ORTHOPEDICS CLINIC | Facility: CLINIC | Age: 79
End: 2024-01-01

## 2024-01-03 ENCOUNTER — OFFICE VISIT (OUTPATIENT)
Dept: ORTHOPEDICS CLINIC | Facility: CLINIC | Age: 79
End: 2024-01-03
Payer: COMMERCIAL

## 2024-01-03 ENCOUNTER — HOSPITAL ENCOUNTER (OUTPATIENT)
Dept: GENERAL RADIOLOGY | Age: 79
Discharge: HOME | End: 2024-01-03
Attending: PHYSICIAN ASSISTANT
Payer: MEDICARE

## 2024-01-03 DIAGNOSIS — S42.292G OTHER CLOSED DISPLACED FRACTURE OF PROXIMAL END OF LEFT HUMERUS WITH DELAYED HEALING, SUBSEQUENT ENCOUNTER: Primary | ICD-10-CM

## 2024-01-03 DIAGNOSIS — S42.412A LEFT SUPRACONDYLAR HUMERUS FRACTURE, CLOSED, INITIAL ENCOUNTER: ICD-10-CM

## 2024-01-03 PROCEDURE — 99213 OFFICE O/P EST LOW 20 MIN: CPT | Performed by: PHYSICIAN ASSISTANT

## 2024-01-03 PROCEDURE — 1125F AMNT PAIN NOTED PAIN PRSNT: CPT | Performed by: PHYSICIAN ASSISTANT

## 2024-01-03 PROCEDURE — 1159F MED LIST DOCD IN RCRD: CPT | Performed by: PHYSICIAN ASSISTANT

## 2024-01-03 PROCEDURE — 1160F RVW MEDS BY RX/DR IN RCRD: CPT | Performed by: PHYSICIAN ASSISTANT

## 2024-01-03 PROCEDURE — 73030 X-RAY EXAM OF SHOULDER: CPT | Performed by: PHYSICIAN ASSISTANT

## 2024-01-03 RX ORDER — MULTIVITAMIN
1 TABLET ORAL DAILY
COMMUNITY

## 2024-01-03 RX ORDER — ROSUVASTATIN CALCIUM 20 MG/1
TABLET, COATED ORAL
COMMUNITY

## 2024-01-03 NOTE — PROGRESS NOTES
[facility] - ORTHOPEDICS  [address] [place] [address]  [phone]       Name: Su Murillo   MRN: [id number]  Date: 1/3/2024     REASON FOR VISIT: Follow up for left proximal humeral fracture.       INTERVAL HISTORY:  Su Christensen Ace is a 78 year old female who returns for evaluation of left proximal humeral fracture.    To summarize, left shoulder injury after fall on November 10, 2023 while in [place]. She has since had difficulty with any movement of the left arm.  She is retired.  She complains of sharp, constant pain and difficulty with range of motion.  She rates her pain to be a 9/10, and 0% of normal function.      At her last visit we recommended immobilization with sling management and she presents today for recheck.      ROS: ROS    PE:   There were no vitals filed for this visit.  Estimated body mass index is 26.29 kg/m² as calculated from the following:    Height as of 11/15/23: 5' 5\" (1.651 m).    Weight as of 11/15/23: 158 lb (71.7 kg).    Physical Exam  Constitutional:       Appearance: Normal appearance.   HENT:      Head: Normocephalic and atraumatic.   Eyes:      Extraocular Movements: Extraocular movements intact.   Neck:      Musculoskeletal: Normal range of motion and neck supple.   Cardiovascular:      Pulses: Normal pulses.   Pulmonary:      Effort: Pulmonary effort is normal. No respiratory distress.   Abdominal:      General: There is no distension.   Skin:     General: Skin is warm.      Capillary Refill: Capillary refill takes less than 2 seconds.      Findings: No bruising.   Neurological:      General: No focal deficit present.      Mental Status: She is alert.   Psychiatric:         Mood and Affect: Mood normal.       Examination of the left  shoulder demonstrates:      Skin is intact, warm and dry.      Inspection:   Atrophy: none    Effusion: none    Edema: none    Erythema: none    Hematoma: none       Palpation:   Tenderness at fracture site.       ROM:   Not tested d/t nature of injury and fracture.      Strength: Not tested d/t nature of injury and fracture.      Special Tests:   Not tested d/t nature of injury and fracture.      No obvious peripheral edema noted.   Distal neurovascular exam demonstrates normal perfusion, intact sensation to light touch and full strength.         The contralateral upper extremity is without limitation in range of motion or strength, no positive provocative maneuvers.     Radiographic Examination/Diagnostics:    I personally viewed, independently interpreted and radiology report was reviewed.    X-rays were reviewed from January 3, 2024 demonstrating evidence of a left proximal humerus fracture with evidence of delayed healing.    IMPRESSION: Su Christensen Ace is a 78 year old female who presented for follow up of left proximal humerus fracture sustained on November 10, 2023 with evidence of delayed healing.     PLAN:   We had a detailed discussion outlining the etiology, anatomy, pathophysiology, and natural history of the patient's findings.    We reviewed the treatment of this disease condition.  She can transition out of the sling, and begin gentle PT in two weeks. We will have her see Haritha at [facility].     FOLLOW-UP:  8 weeks, with repeat shoulder x-rays (not humerus, avoid axillary).             Jamar Nelson [facility], PA-C Orthopedic Surgery / Sports Medicine Specialist  [facility]  [address] [place] [address].org  [email or web address]  t: [phone]  o: [phone]  f: [phone]    This note was dictated using Dragon software.  While it was briefly proofread prior to completion, some grammatical, spelling, and word choice errors due to dictation may still occur.

## 2024-02-27 ENCOUNTER — TELEPHONE (OUTPATIENT)
Dept: ORTHOPEDICS CLINIC | Facility: CLINIC | Age: 79
End: 2024-02-27

## 2024-02-27 DIAGNOSIS — S42.292G OTHER CLOSED DISPLACED FRACTURE OF PROXIMAL END OF LEFT HUMERUS WITH DELAYED HEALING, SUBSEQUENT ENCOUNTER: Primary | ICD-10-CM

## 2024-03-04 ENCOUNTER — HOSPITAL ENCOUNTER (OUTPATIENT)
Dept: GENERAL RADIOLOGY | Age: 79
Discharge: HOME | End: 2024-03-04
Attending: PHYSICIAN ASSISTANT
Payer: MEDICARE

## 2024-03-04 ENCOUNTER — OFFICE VISIT (OUTPATIENT)
Dept: ORTHOPEDICS CLINIC | Facility: CLINIC | Age: 79
End: 2024-03-04
Payer: COMMERCIAL

## 2024-03-04 DIAGNOSIS — S42.292G OTHER CLOSED DISPLACED FRACTURE OF PROXIMAL END OF LEFT HUMERUS WITH DELAYED HEALING, SUBSEQUENT ENCOUNTER: Primary | ICD-10-CM

## 2024-03-04 DIAGNOSIS — S42.292G OTHER CLOSED DISPLACED FRACTURE OF PROXIMAL END OF LEFT HUMERUS WITH DELAYED HEALING, SUBSEQUENT ENCOUNTER: ICD-10-CM

## 2024-03-04 PROCEDURE — 73030 X-RAY EXAM OF SHOULDER: CPT | Performed by: PHYSICIAN ASSISTANT

## 2024-03-04 PROCEDURE — 99213 OFFICE O/P EST LOW 20 MIN: CPT | Performed by: PHYSICIAN ASSISTANT

## 2024-03-04 NOTE — PROGRESS NOTES
[facility] - ORTHOPEDICS  [address] [place] [address]  [phone]       Name: Su Murillo   MRN: [id number]  Date: 3/4/2024     REASON FOR VISIT: Follow up for  left proximal humeral fracture.        INTERVAL HISTORY:  Su Christensen Ace is a 78 year old female who returns for evaluation of  left proximal humeral fracture.        To summarize, left shoulder injury after fall on November 10, 2023 while in [place]. She has since had difficulty with any movement of the left arm.  She is retired.  She notes improvement.       ROS: ROS    PE:   There were no vitals filed for this visit.  Estimated body mass index is 26.29 kg/m² as calculated from the following:    Height as of 11/15/23: 5' 5\" (1.651 m).    Weight as of 11/15/23: 158 lb (71.7 kg).    Physical Exam  Constitutional:       Appearance: Normal appearance.   HENT:      Head: Normocephalic and atraumatic.   Eyes:      Extraocular Movements: Extraocular movements intact.   Neck:      Musculoskeletal: Normal range of motion and neck supple.   Cardiovascular:      Pulses: Normal pulses.   Pulmonary:      Effort: Pulmonary effort is normal. No respiratory distress.   Abdominal:      General: There is no distension.   Skin:     General: Skin is warm.      Capillary Refill: Capillary refill takes less than 2 seconds.      Findings: No bruising.   Neurological:      General: No focal deficit present.      Mental Status: She is alert.   Psychiatric:         Mood and Affect: Mood normal.     Examination of the left shoulder demonstrates:      Skin is intact, warm and dry.      Inspection:   Atrophy: none    Effusion: none    Edema: none    Erythema: none    Hematoma: none       Palpation:   None.      ROM:   155/ 40 and IR to L5.      Strength: 5-      Special Tests:   Not tested d/t nature of injury and fracture.      No obvious peripheral edema noted.   Distal neurovascular exam demonstrates normal perfusion, intact sensation to  light touch and full strength.      The contralateral upper extremity is without limitation in range of motion or strength, no positive provocative maneuvers.     Radiographic Examination/Diagnostics:    I personally viewed, independently interpreted and radiology report was reviewed.    X-ray, 3/4/2024 Left Shoulder - improved callus formation.     IMPRESSION: Su Christensen Ace is a 78 year old female who presented for follow up of  left proximal humeral fracture.        PLAN:   We had a detailed discussion outlining the etiology, anatomy, pathophysiology, and natural history of the patient's findings.    We reviewed the treatment of this disease condition.  We recommended physical therapy to aid in strengthening, range of motion, functional improvement, and return to baseline activity.  The patient had opportunity to ask questions and all questions were answered appropriately.    FOLLOW-UP:  Return to clinic on an as needed basis.             Jamar Nelson [facility], PA-C Orthopedic Surgery / Sports Medicine Specialist  [facility]  [address] [place] [address] [id number] [place] [email or web address]  [email or web address]  t: [phone]  o: [phone]  f: [phone]    This note was dictated using Dragon software.  While it was briefly proofread prior to completion, some grammatical, spelling, and word choice errors due to dictation may still occur.

## 2024-03-13 DIAGNOSIS — S42.292G OTHER CLOSED DISPLACED FRACTURE OF PROXIMAL END OF LEFT HUMERUS WITH DELAYED HEALING, SUBSEQUENT ENCOUNTER: Primary | ICD-10-CM

## 2024-03-14 ENCOUNTER — MED REC SCAN ONLY (OUTPATIENT)
Dept: ORTHOPEDICS CLINIC | Facility: CLINIC | Age: 79
End: 2024-03-14

## 2024-05-22 ENCOUNTER — MED REC SCAN ONLY (OUTPATIENT)
Dept: ORTHOPEDICS CLINIC | Facility: CLINIC | Age: 79
End: 2024-05-22

## 2024-06-20 PROBLEM — R13.19 ESOPHAGEAL DYSPHAGIA: Status: ACTIVE | Noted: 2024-06-20

## 2024-07-10 PROBLEM — R13.10 PROBLEMS WITH SWALLOWING AND MASTICATION: Status: ACTIVE | Noted: 2024-07-10

## 2024-07-10 PROCEDURE — 88305 TISSUE EXAM BY PATHOLOGIST: CPT | Performed by: INTERNAL MEDICINE

## 2024-09-03 ENCOUNTER — HOSPITAL ENCOUNTER (OUTPATIENT)
Dept: CT IMAGING | Facility: HOSPITAL | Age: 79
Discharge: HOME | End: 2024-09-03
Attending: INTERNAL MEDICINE
Payer: MEDICARE

## 2024-09-03 ENCOUNTER — LAB ENCOUNTER (OUTPATIENT)
Dept: LAB | Facility: HOSPITAL | Age: 79
End: 2024-09-03
Attending: INTERNAL MEDICINE
Payer: MEDICARE

## 2024-09-03 DIAGNOSIS — R19.7 DIARRHEA: ICD-10-CM

## 2024-09-03 DIAGNOSIS — R10.9 ABDOMINAL PAIN: ICD-10-CM

## 2024-09-03 DIAGNOSIS — Z79.899 MEDICATION MANAGEMENT: ICD-10-CM

## 2024-09-03 DIAGNOSIS — R19.7 DIARRHEA: Primary | ICD-10-CM

## 2024-09-03 LAB
ALBUMIN SERPL-MCNC: 5.2 G/DL (ref 3.2–4.8)
ALBUMIN/GLOB SERPL: 2.3 {RATIO} (ref 1–2)
ALP LIVER SERPL-CCNC: 84 U/L
ALT SERPL-CCNC: 13 U/L
ANION GAP SERPL CALC-SCNC: 6 MMOL/L (ref 0–18)
AST SERPL-CCNC: 18 U/L (ref ?–34)
BASOPHILS # BLD AUTO: 0.03 X10(3) UL (ref 0–0.2)
BASOPHILS NFR BLD AUTO: 0.4 %
BILIRUB SERPL-MCNC: 1.2 MG/DL (ref 0.2–1.1)
BILIRUB UR QL STRIP.AUTO: NEGATIVE
BUN BLD-MCNC: 10 MG/DL (ref 9–23)
CALCIUM BLD-MCNC: 9.9 MG/DL (ref 8.7–10.4)
CHLORIDE SERPL-SCNC: 106 MMOL/L (ref 98–112)
CHOLEST SERPL-MCNC: 227 MG/DL (ref ?–200)
CLARITY UR REFRACT.AUTO: CLEAR
CO2 SERPL-SCNC: 28 MMOL/L (ref 21–32)
COLOR UR AUTO: YELLOW
CREAT BLD-MCNC: 0.7 MG/DL
CREAT BLD-MCNC: 0.79 MG/DL
EGFRCR SERPLBLD CKD-EPI 2021: 77 ML/MIN/1.73M2 (ref 60–?)
EGFRCR SERPLBLD CKD-EPI 2021: 90 ML/MIN/1.73M2 (ref 60–?)
EOSINOPHIL # BLD AUTO: 0.08 X10(3) UL (ref 0–0.7)
EOSINOPHIL NFR BLD AUTO: 1 %
ERYTHROCYTE [DISTWIDTH] IN BLOOD BY AUTOMATED COUNT: 11.9 %
FASTING PATIENT LIPID ANSWER: YES
FASTING STATUS PATIENT QL REPORTED: YES
GLOBULIN PLAS-MCNC: 2.3 G/DL (ref 2–3.5)
GLUCOSE BLD-MCNC: 104 MG/DL (ref 70–99)
GLUCOSE UR STRIP.AUTO-MCNC: NORMAL MG/DL
HCT VFR BLD AUTO: 45.6 %
HDLC SERPL-MCNC: 113 MG/DL (ref 40–59)
HGB BLD-MCNC: 16 G/DL
HYALINE CASTS #/AREA URNS AUTO: PRESENT /LPF
IMM GRANULOCYTES # BLD AUTO: 0.02 X10(3) UL (ref 0–1)
IMM GRANULOCYTES NFR BLD: 0.2 %
KETONES UR STRIP.AUTO-MCNC: 20 MG/DL
LDLC SERPL CALC-MCNC: 99 MG/DL (ref ?–100)
LEUKOCYTE ESTERASE UR QL STRIP.AUTO: 250
LYMPHOCYTES # BLD AUTO: 1.08 X10(3) UL (ref 1–4)
LYMPHOCYTES NFR BLD AUTO: 13 %
MCH RBC QN AUTO: 33.5 PG (ref 26–34)
MCHC RBC AUTO-ENTMCNC: 35.1 G/DL (ref 31–37)
MCV RBC AUTO: 95.4 FL
MONOCYTES # BLD AUTO: 0.5 X10(3) UL (ref 0.1–1)
MONOCYTES NFR BLD AUTO: 6 %
NEUTROPHILS # BLD AUTO: 6.62 X10 (3) UL (ref 1.5–7.7)
NEUTROPHILS # BLD AUTO: 6.62 X10(3) UL (ref 1.5–7.7)
NEUTROPHILS NFR BLD AUTO: 79.4 %
NITRITE UR QL STRIP.AUTO: NEGATIVE
NONHDLC SERPL-MCNC: 114 MG/DL (ref ?–130)
OSMOLALITY SERPL CALC.SUM OF ELEC: 289 MOSM/KG (ref 275–295)
PH UR STRIP.AUTO: 6 [PH] (ref 5–8)
PLATELET # BLD AUTO: 149 10(3)UL (ref 150–450)
POTASSIUM SERPL-SCNC: 4.7 MMOL/L (ref 3.5–5.1)
PROT SERPL-MCNC: 7.5 G/DL (ref 5.7–8.2)
PROT UR STRIP.AUTO-MCNC: 50 MG/DL
RBC # BLD AUTO: 4.78 X10(6)UL
SODIUM SERPL-SCNC: 140 MMOL/L (ref 136–145)
SP GR UR STRIP.AUTO: 1.03 (ref 1–1.03)
TRIGL SERPL-MCNC: 87 MG/DL (ref 30–149)
TSI SER-ACNC: 2.38 MIU/ML (ref 0.55–4.78)
UROBILINOGEN UR STRIP.AUTO-MCNC: NORMAL MG/DL
VLDLC SERPL CALC-MCNC: 14 MG/DL (ref 0–30)
WBC # BLD AUTO: 8.3 X10(3) UL (ref 4–11)

## 2024-09-03 PROCEDURE — 85025 COMPLETE CBC W/AUTO DIFF WBC: CPT

## 2024-09-03 PROCEDURE — 87086 URINE CULTURE/COLONY COUNT: CPT

## 2024-09-03 PROCEDURE — 80053 COMPREHEN METABOLIC PANEL: CPT

## 2024-09-03 PROCEDURE — 81001 URINALYSIS AUTO W/SCOPE: CPT

## 2024-09-03 PROCEDURE — 84443 ASSAY THYROID STIM HORMONE: CPT

## 2024-09-03 PROCEDURE — 82565 ASSAY OF CREATININE: CPT

## 2024-09-03 PROCEDURE — 74177 CT ABD & PELVIS W/CONTRAST: CPT | Performed by: INTERNAL MEDICINE

## 2024-09-03 PROCEDURE — 80061 LIPID PANEL: CPT

## 2024-09-03 PROCEDURE — 36415 COLL VENOUS BLD VENIPUNCTURE: CPT

## 2024-09-04 ENCOUNTER — HOSPITAL ENCOUNTER (INPATIENT)
Facility: HOSPITAL | Age: 79
LOS: 1 days | Discharge: HOME | End: 2024-09-06
Attending: EMERGENCY MEDICINE | Admitting: HOSPITALIST
Payer: MEDICARE

## 2024-09-04 ENCOUNTER — APPOINTMENT (OUTPATIENT)
Dept: MRI IMAGING | Facility: HOSPITAL | Age: 79
End: 2024-09-04
Attending: NURSE PRACTITIONER
Payer: MEDICARE

## 2024-09-04 DIAGNOSIS — K81.9 CHOLECYSTITIS: Primary | ICD-10-CM

## 2024-09-04 DIAGNOSIS — K81.0 ACUTE CHOLECYSTITIS: Primary | ICD-10-CM

## 2024-09-04 PROBLEM — K80.01 CALCULUS OF GALLBLADDER WITH ACUTE CHOLECYSTITIS AND OBSTRUCTION: Status: ACTIVE | Noted: 2024-09-04

## 2024-09-04 PROBLEM — K83.1 COMMON BILE DUCT (CBD) STRICTURE (HCC): Status: ACTIVE | Noted: 2024-09-04

## 2024-09-04 LAB
ALBUMIN SERPL-MCNC: 5 G/DL (ref 3.2–4.8)
ALBUMIN/GLOB SERPL: 2.1 {RATIO} (ref 1–2)
ALP LIVER SERPL-CCNC: 79 U/L
ALT SERPL-CCNC: 13 U/L
ANION GAP SERPL CALC-SCNC: 15 MMOL/L (ref 0–18)
APTT PPP: 34.7 SECONDS (ref 23–36)
AST SERPL-CCNC: 26 U/L (ref ?–34)
BASOPHILS # BLD AUTO: 0.04 X10(3) UL (ref 0–0.2)
BASOPHILS NFR BLD AUTO: 0.5 %
BILIRUB SERPL-MCNC: 1.1 MG/DL (ref 0.2–1.1)
BUN BLD-MCNC: 8 MG/DL (ref 9–23)
CALCIUM BLD-MCNC: 10.1 MG/DL (ref 8.7–10.4)
CHLORIDE SERPL-SCNC: 106 MMOL/L (ref 98–112)
CO2 SERPL-SCNC: 16 MMOL/L (ref 21–32)
CREAT BLD-MCNC: 0.72 MG/DL
EGFRCR SERPLBLD CKD-EPI 2021: 87 ML/MIN/1.73M2 (ref 60–?)
EOSINOPHIL # BLD AUTO: 0.09 X10(3) UL (ref 0–0.7)
EOSINOPHIL NFR BLD AUTO: 1.2 %
ERYTHROCYTE [DISTWIDTH] IN BLOOD BY AUTOMATED COUNT: 12 %
GLOBULIN PLAS-MCNC: 2.4 G/DL (ref 2–3.5)
GLUCOSE BLD-MCNC: 93 MG/DL (ref 70–99)
HCT VFR BLD AUTO: 43.7 %
HGB BLD-MCNC: 15.5 G/DL
IMM GRANULOCYTES # BLD AUTO: 0.02 X10(3) UL (ref 0–1)
IMM GRANULOCYTES NFR BLD: 0.3 %
INR BLD: 1.12 (ref 0.8–1.2)
LIPASE SERPL-CCNC: 40 U/L (ref 12–53)
LYMPHOCYTES # BLD AUTO: 1.25 X10(3) UL (ref 1–4)
LYMPHOCYTES NFR BLD AUTO: 16.1 %
MCH RBC QN AUTO: 33.5 PG (ref 26–34)
MCHC RBC AUTO-ENTMCNC: 35.5 G/DL (ref 31–37)
MCV RBC AUTO: 94.4 FL
MONOCYTES # BLD AUTO: 0.6 X10(3) UL (ref 0.1–1)
MONOCYTES NFR BLD AUTO: 7.8 %
NEUTROPHILS # BLD AUTO: 5.74 X10 (3) UL (ref 1.5–7.7)
NEUTROPHILS # BLD AUTO: 5.74 X10(3) UL (ref 1.5–7.7)
NEUTROPHILS NFR BLD AUTO: 74.1 %
OSMOLALITY SERPL CALC.SUM OF ELEC: 282 MOSM/KG (ref 275–295)
PLATELET # BLD AUTO: 158 10(3)UL (ref 150–450)
POTASSIUM SERPL-SCNC: 4.3 MMOL/L (ref 3.5–5.1)
PROT SERPL-MCNC: 7.4 G/DL (ref 5.7–8.2)
PROTHROMBIN TIME: 14.4 SECONDS (ref 11.6–14.8)
RBC # BLD AUTO: 4.63 X10(6)UL
SODIUM SERPL-SCNC: 137 MMOL/L (ref 136–145)
WBC # BLD AUTO: 7.7 X10(3) UL (ref 4–11)

## 2024-09-04 PROCEDURE — 99223 1ST HOSP IP/OBS HIGH 75: CPT | Performed by: INTERNAL MEDICINE

## 2024-09-04 PROCEDURE — 74183 MRI ABD W/O CNTR FLWD CNTR: CPT | Performed by: NURSE PRACTITIONER

## 2024-09-04 PROCEDURE — 76376 3D RENDER W/INTRP POSTPROCES: CPT | Performed by: NURSE PRACTITIONER

## 2024-09-04 PROCEDURE — 99222 1ST HOSP IP/OBS MODERATE 55: CPT | Performed by: SURGERY

## 2024-09-04 RX ORDER — HYDROMORPHONE HYDROCHLORIDE 1 MG/ML
0.4 INJECTION, SOLUTION INTRAMUSCULAR; INTRAVENOUS; SUBCUTANEOUS EVERY 2 HOUR PRN
Status: DISCONTINUED | OUTPATIENT
Start: 2024-09-04 | End: 2024-09-06

## 2024-09-04 RX ORDER — HYDROMORPHONE HYDROCHLORIDE 1 MG/ML
0.8 INJECTION, SOLUTION INTRAMUSCULAR; INTRAVENOUS; SUBCUTANEOUS EVERY 2 HOUR PRN
Status: DISCONTINUED | OUTPATIENT
Start: 2024-09-04 | End: 2024-09-04 | Stop reason: DRUGHIGH

## 2024-09-04 RX ORDER — HYDROMORPHONE HYDROCHLORIDE 1 MG/ML
0.5 INJECTION, SOLUTION INTRAMUSCULAR; INTRAVENOUS; SUBCUTANEOUS EVERY 30 MIN PRN
Status: DISCONTINUED | OUTPATIENT
Start: 2024-09-04 | End: 2024-09-04 | Stop reason: DRUGHIGH

## 2024-09-04 RX ORDER — SODIUM CHLORIDE 9 MG/ML
125 INJECTION, SOLUTION INTRAVENOUS CONTINUOUS
Status: DISCONTINUED | OUTPATIENT
Start: 2024-09-04 | End: 2024-09-05

## 2024-09-04 RX ORDER — HYDROCODONE BITARTRATE AND ACETAMINOPHEN 5; 325 MG/1; MG/1
2 TABLET ORAL EVERY 4 HOURS PRN
Status: DISCONTINUED | OUTPATIENT
Start: 2024-09-04 | End: 2024-09-04 | Stop reason: ALTCHOICE

## 2024-09-04 RX ORDER — HYDROMORPHONE HYDROCHLORIDE 1 MG/ML
0.2 INJECTION, SOLUTION INTRAMUSCULAR; INTRAVENOUS; SUBCUTANEOUS EVERY 2 HOUR PRN
Status: DISCONTINUED | OUTPATIENT
Start: 2024-09-04 | End: 2024-09-04 | Stop reason: DRUGHIGH

## 2024-09-04 RX ORDER — GADOTERATE MEGLUMINE 376.9 MG/ML
15 INJECTION INTRAVENOUS
Status: COMPLETED | OUTPATIENT
Start: 2024-09-04 | End: 2024-09-04

## 2024-09-04 RX ORDER — HYDROCODONE BITARTRATE AND ACETAMINOPHEN 5; 325 MG/1; MG/1
2 TABLET ORAL EVERY 4 HOURS PRN
Status: DISCONTINUED | OUTPATIENT
Start: 2024-09-04 | End: 2024-09-06

## 2024-09-04 RX ORDER — BISACODYL 10 MG
10 SUPPOSITORY, RECTAL RECTAL
Status: DISCONTINUED | OUTPATIENT
Start: 2024-09-04 | End: 2024-09-06

## 2024-09-04 RX ORDER — ONDANSETRON 2 MG/ML
4 INJECTION INTRAMUSCULAR; INTRAVENOUS EVERY 6 HOURS PRN
Status: DISCONTINUED | OUTPATIENT
Start: 2024-09-04 | End: 2024-09-04

## 2024-09-04 RX ORDER — ONDANSETRON 2 MG/ML
4 INJECTION INTRAMUSCULAR; INTRAVENOUS EVERY 6 HOURS PRN
Status: DISCONTINUED | OUTPATIENT
Start: 2024-09-04 | End: 2024-09-06

## 2024-09-04 RX ORDER — METOCLOPRAMIDE HYDROCHLORIDE 5 MG/ML
5 INJECTION INTRAMUSCULAR; INTRAVENOUS EVERY 8 HOURS PRN
Status: DISCONTINUED | OUTPATIENT
Start: 2024-09-04 | End: 2024-09-06

## 2024-09-04 RX ORDER — SENNOSIDES 8.6 MG
17.2 TABLET ORAL NIGHTLY PRN
Status: DISCONTINUED | OUTPATIENT
Start: 2024-09-04 | End: 2024-09-06

## 2024-09-04 RX ORDER — ACETAMINOPHEN 500 MG
500 TABLET ORAL EVERY 4 HOURS PRN
Status: DISCONTINUED | OUTPATIENT
Start: 2024-09-04 | End: 2024-09-06

## 2024-09-04 RX ORDER — SODIUM CHLORIDE 9 MG/ML
INJECTION, SOLUTION INTRAVENOUS CONTINUOUS
Status: DISCONTINUED | OUTPATIENT
Start: 2024-09-04 | End: 2024-09-04

## 2024-09-04 RX ORDER — HYDROCODONE BITARTRATE AND ACETAMINOPHEN 5; 325 MG/1; MG/1
1 TABLET ORAL EVERY 4 HOURS PRN
Status: DISCONTINUED | OUTPATIENT
Start: 2024-09-04 | End: 2024-09-06

## 2024-09-04 RX ORDER — MELATONIN
3 NIGHTLY PRN
Status: DISCONTINUED | OUTPATIENT
Start: 2024-09-04 | End: 2024-09-06

## 2024-09-04 RX ORDER — METOCLOPRAMIDE HYDROCHLORIDE 5 MG/ML
5 INJECTION INTRAMUSCULAR; INTRAVENOUS EVERY 8 HOURS PRN
Status: DISCONTINUED | OUTPATIENT
Start: 2024-09-04 | End: 2024-09-04

## 2024-09-04 RX ORDER — HYDROMORPHONE HYDROCHLORIDE 1 MG/ML
0.2 INJECTION, SOLUTION INTRAMUSCULAR; INTRAVENOUS; SUBCUTANEOUS EVERY 2 HOUR PRN
Status: DISCONTINUED | OUTPATIENT
Start: 2024-09-04 | End: 2024-09-06

## 2024-09-04 RX ORDER — HYDROMORPHONE HYDROCHLORIDE 1 MG/ML
0.5 INJECTION, SOLUTION INTRAMUSCULAR; INTRAVENOUS; SUBCUTANEOUS EVERY 30 MIN PRN
Status: DISCONTINUED | OUTPATIENT
Start: 2024-09-04 | End: 2024-09-04

## 2024-09-04 RX ORDER — ENOXAPARIN SODIUM 100 MG/ML
40 INJECTION SUBCUTANEOUS DAILY
Status: DISCONTINUED | OUTPATIENT
Start: 2024-09-04 | End: 2024-09-06

## 2024-09-04 RX ORDER — HYDROMORPHONE HYDROCHLORIDE 1 MG/ML
0.8 INJECTION, SOLUTION INTRAMUSCULAR; INTRAVENOUS; SUBCUTANEOUS EVERY 2 HOUR PRN
Status: DISCONTINUED | OUTPATIENT
Start: 2024-09-04 | End: 2024-09-06

## 2024-09-04 RX ORDER — SERTRALINE HYDROCHLORIDE 25 MG/1
25 TABLET, FILM COATED ORAL DAILY
Status: DISCONTINUED | OUTPATIENT
Start: 2024-09-05 | End: 2024-09-06

## 2024-09-04 RX ORDER — POLYETHYLENE GLYCOL 3350 17 G/17G
17 POWDER, FOR SOLUTION ORAL DAILY PRN
Status: DISCONTINUED | OUTPATIENT
Start: 2024-09-04 | End: 2024-09-06

## 2024-09-04 RX ORDER — ONDANSETRON 2 MG/ML
4 INJECTION INTRAMUSCULAR; INTRAVENOUS EVERY 4 HOURS PRN
Status: DISCONTINUED | OUTPATIENT
Start: 2024-09-04 | End: 2024-09-04

## 2024-09-04 RX ORDER — ACETAMINOPHEN 325 MG/1
650 TABLET ORAL EVERY 4 HOURS PRN
Status: DISCONTINUED | OUTPATIENT
Start: 2024-09-04 | End: 2024-09-04 | Stop reason: ALTCHOICE

## 2024-09-04 RX ORDER — SODIUM CHLORIDE 9 MG/ML
INJECTION, SOLUTION INTRAVENOUS CONTINUOUS
Status: DISCONTINUED | OUTPATIENT
Start: 2024-09-04 | End: 2024-09-06

## 2024-09-04 RX ORDER — ECHINACEA PURPUREA EXTRACT 125 MG
1 TABLET ORAL
Status: DISCONTINUED | OUTPATIENT
Start: 2024-09-04 | End: 2024-09-06

## 2024-09-04 RX ORDER — HYDROCODONE BITARTRATE AND ACETAMINOPHEN 5; 325 MG/1; MG/1
1 TABLET ORAL EVERY 4 HOURS PRN
Status: DISCONTINUED | OUTPATIENT
Start: 2024-09-04 | End: 2024-09-04 | Stop reason: ALTCHOICE

## 2024-09-04 RX ORDER — ROSUVASTATIN CALCIUM 20 MG/1
20 TABLET, COATED ORAL DAILY
Status: DISCONTINUED | OUTPATIENT
Start: 2024-09-05 | End: 2024-09-06

## 2024-09-04 RX ORDER — HYDROMORPHONE HYDROCHLORIDE 1 MG/ML
0.4 INJECTION, SOLUTION INTRAMUSCULAR; INTRAVENOUS; SUBCUTANEOUS EVERY 2 HOUR PRN
Status: DISCONTINUED | OUTPATIENT
Start: 2024-09-04 | End: 2024-09-04 | Stop reason: DRUGHIGH

## 2024-09-04 RX ORDER — SODIUM PHOSPHATE, DIBASIC AND SODIUM PHOSPHATE, MONOBASIC 7; 19 G/230ML; G/230ML
1 ENEMA RECTAL ONCE AS NEEDED
Status: DISCONTINUED | OUTPATIENT
Start: 2024-09-04 | End: 2024-09-06

## 2024-09-04 NOTE — CONSULTS
[facility]  Report of Consultation    Su Christensen Ace Patient Status:  Emergency    1945 MRN [id number]   Location [facility] EMERGENCY DEPARTMENT Attending Alex Collins MD   Hosp Day # 0 PCP Liliana Ybarra MD     Requesting Physician:  Dr. Collins    Reason for Consultation:  Acute cholecystitis     Chief Complaint:  Abdominal pain    History of Present Illness:  Su Christensen Ace is a 78 year old female who presents to [facility] with worsening abdominal pain.    On Saturday, the patient reports feeling off. She did have a bowl of chili for dinner. On , she started to experience right upper quadrant abdominal pain. Since then, she reports decreased appetite and is unsure if her pain is associated with food. She denies nausea or vomiting. She denies fevers or chills. She reports non-bloody diarrhea x 3 days.     She recently underwent an endoscopy due to a choking episode. During the endoscopy, she was found to have an esophageal stricture and underwent dilation in 2024.    Upon presentation to the hospital, the patient was hemodynamically stable. CBC reveals WBC 7.7 with no left shift, hemoglobin 15.1, platelets 158,000. No electrolyte abnormalities. No acute kidney injury. AST 26, ALT 13, alkaline phosphatase 79. Total bilirubin 1.1. Lipase 40.     CT of the abdomen and pelvis reveals mild to moderate gallbladder distension with gallbladder wall thickening and mild pericholecystic inflammatory changes which are concerning for acute cholecystitis. There is dilation of the common bile duct measuring up to 10 mm in diameter with a distal obstructing stone, stricture or mass can not be excluded. MRI is ordered and pending.     The patient denies significant past surgical history. She has a significant past medical history of chronic intermittent dysphagia, lymphocytic esophagitis on biopsy, COPD and depression. She denies taking blood thinning medication.      History:  Past Medical  History:    Body piercing    Ears    Calculus of kidney    COPD (chronic obstructive pulmonary disease) (HCC)    Decorative tattoo    Depression    Felicia Swain virus infection    High cholesterol    Slight elevation    Painful swallowing    At times    Pneumonia due to organism    Problems with swallowing    Years ago    Renal disorder    Sinus infection     Past Surgical History:   Procedure Laterality Date    Biopsy/removal, lymph node(s)      David biopsy stereo nodule 1 site right (cpt=19081)      2015    Tonsillectomy  1953     Family History   Problem Relation Age of Onset    Cancer Father     Heart Disorder Father     Other (Other) Mother     Stroke Mother     Diabetes Maternal Grandmother     Other (Other) Maternal Grandmother       reports that she quit smoking about 5 years ago. Her smoking use included cigarettes. She has a 35 pack-year smoking history. She has never used smokeless tobacco. She reports current alcohol use of about 14.0 standard drinks of alcohol per week. She reports that she does not use drugs.    Allergies:  Allergies   Allergen Reactions    Augmentin [Amoxicillin-Pot Clavulanate] ANAPHYLAXIS    Penicillins ANAPHYLAXIS    Keflex [Cephalexin] RASH       Medications:  (Not in a hospital admission)        Current Facility-Administered Medications:     HYDROmorphone (Dilaudid) 1 MG/ML injection 0.5 mg, 0.5 mg, Intravenous, Q30 Min PRN    sodium chloride 0.9% infusion, 125 mL/hr, Intravenous, Continuous    Review of Systems:  Pertinent items are noted in HPI.  Allergic/Immuno:  Review of patient's allergies performed.  Cardiovascular:  Negative for cool extremity and irregular heartbeat/palpitations  Constitutional:  Negative for decreased activity, fever, irritability and lethargy  Endocrine:  Negative for abnormal sleep patterns, increased activity, polydipsia and polyphagia  ENMT:  Negative for ear drainage, hearing loss and nasal drainage  Eyes:  Negative for eye discharge and vision  loss  Gastrointestinal:  Positive for abdominal pain, diarrhea.  Negative for constipation, decreased appetite, and vomiting  Genitourinary:  Negative for dysuria and hematuria  Hema/Lymph:  Negative for easy bleeding and easy bruising  Integumentary:  Negative for pruritus and rash  Musculoskeletal:  Negative for bone/joint symptoms  Neurological:  Negative for gait disturbance  Psychiatric:  Negative for inappropriate interaction and psychiatric symptoms  Respiratory:  Negative for cough, dyspnea and wheezing      Physical Exam:  Blood pressure 133/77, pulse 70, temperature 96.9 °F (36.1 °C), temperature source Temporal, resp. rate 16, height 65\", weight 155 lb (70.3 kg), SpO2 100%.    General: Alert, orientated x3.  Cooperative.  No apparent distress.    HEENT: Exam is unremarkable.  Without scleral icterus.  Mucous membranes are moist. EOM are WNL.    Neck: No tenderness to palpitation.  Full range of motion to flexion and extension, lateral rotation and lateral flexion of cervical spine.  No JVD. Supple.     Lungs: Clear to auscultation bilaterally. No wheezes, no rales, no rhonchi. Good excursion of the diaphragms. No secondary use of accessory respiratory musculature.    Cardiac: Regular rate and rhythm. No murmur.    Abdomen:  Soft, non-distended, slightly tender to palpation in RUQ, tympanic to percussion. No peritoneal signs. No guarding.     Extremities:  No lower extremity edema noted.  Without clubbing or cyanosis.      Skin: Normal texture and turgor.    Laboratory Data:  Recent Labs   Lab 09/03/24  1641 09/04/24  1332   RBC 4.78 4.63   HGB 16.0 15.5   HCT 45.6 43.7   MCV 95.4 94.4   MCH 33.5 33.5   MCHC 35.1 35.5   RDW 11.9 12.0   NEPRELIM 6.62 5.74   WBC 8.3 7.7   .0* 158.0       Recent Labs   Lab 09/03/24  1641 09/04/24  1332   * 93   BUN 10 8*   CREATSERUM 0.79 0.72   CA 9.9 10.1   ALB 5.2* 5.0*    137   K 4.7 4.3    106   CO2 28.0 16.0*   ALKPHO 84 79   AST 18 26   ALT 13  13   BILT 1.2* 1.1   TP 7.5 7.4         Recent Labs   Lab 09/04/24  1419   PTP 14.4   INR 1.12   PTT 34.7         CT ABDOMEN+PELVIS(CONTRAST ONLY)(CPT=74177)    Result Date: 9/3/2024  CONCLUSION:   1. Mild-to-moderate gallbladder distension with gallbladder wall thickening and mild pericholecystic inflammatory changes are concerning for changes of acute cholecystitis in the appropriate clinical setting.  Clinical correlation recommended.  Dedicated  abdominal ultrasound and/or HIDA scan may be helpful for further evaluation as clinically directed.   2. There is also dilatation of the common bile duct measuring up to 10 mm in diameter with a distal obstructing stone, stricture, or mass not excluded.  Clinical correlation recommended.  MRCP/ERCP may be helpful for further evaluation as clinically directed.  3. Uncomplicated colonic diverticulosis.  4. Indeterminate enhancing lesion in the dome the liver measuring 10 x 10 mm could be further characterized with a nonemergent dedicated MRI of the abdomen with and without Eovist contrast.    Please see above for further details.  The radiology support staff is in the process of contacting the referring physician regarding this report.  LOCATION:  [facility]   Dictated by (CST): Wilmar Cortez MD on 9/03/2024 at 6:37 PM     Finalized by (CST): Wilmar Cortez MD on 9/03/2024 at 6:43 PM          MICKIE Campos  9/4/2024  3:40 PM    Is this a shared or split note between Advanced Practice Provider and Physician? Yes      Medical Decision Making         Impression:  Patient Active Problem List   Diagnosis    Ganglion of wrist, right    Pseudophakia of both eyes    Posterior vitreous detachment of both eyes    CARRIZALES (dyspnea on exertion)    Abscess of parotid gland    Acute parotitis    EBV (Felicia-Barr virus) viremia    Hypocomplementemia (HCC)    Hypogammaglobulinemia (HCC)    Esophageal dysphagia    Problems with swallowing and mastication    Acute cholecystitis        78-year-old female who presents to the emergency department with complaints of abdominal pain.  The patient reports that on Saturday evening she ate a bowl of chili for dinner and subsequently on Sunday began to experience abdominal bloating and distention and right upper quadrant abdominal pain.  Workup in the emergency department revealed normal CBC, CMP with bicarb of 16,  CT scan of the abdomen and pelvis revealed a 1 cm indeterminant enhancing lesion in the liver for which MRI is recommended.  Additional 6 mm lesion which is too small to characterize is also seen.  Mild to moderate gallbladder distention with gallbladder wall thickening and pericholecystic fluid and inflammatory changes consistent with acute cholecystitis is noted.  No CT evidence of cholelithiasis.  Dilatation of the common bile duct measuring up to 10 mm in diameter is seen with a distal obstructing stone, stricture or mass which cannot be differentiated.  MRCP is recommended.  Subcentimeter hypodensities in the spleen and the kidney are seen.  Plaque noted in the aorta and iliac vessels.  Diverticulosis.  On examination, abdomen is soft, nondistended, tender to deep palpation in the right upper quadrant.  Very mildly tender to palpation in the epigastrium.  There is no evidence of guarding, rebound or percussion tenderness.    CT scan findings were reviewed in detail with Su.  At this time the recommendation is to proceed with MRCP.  MRCP is ordered for today.  If MRCP is negative for abnormality, would consider proceeding with laparoscopic cholecystectomy with intraoperative cholangiogram for acute cholecystitis definitive plan will be pending MRCP tomorrow.  Su is comfortable with this treatment plan.  She has no further questions at this time and wishes to proceed as discussed.      Past medical history-ganglion cyst, Felicia-Barr, esophageal dysphagia for which the patient underwent recent dilatation in July 2024, benign  breast biopsy  The patient is not anticoagulated    JUANITO Mcguire MD, FACS    Please note that this report has been produced using speech recognition software and may contain errors related to that system including but not limited to errors in grammar, punctuation and spelling as well as words and phrases that possibly may have been recognized inappropriately.  If there are any questions or concerns please contact the dictating provider for clarification.  The 21st Century Cures Act makes medical notes like these available to patients in the interest of trans parency. Please be advised this is a medical document. Medical documents are intended to carry relevant information, facts as evident, and the clinical opinion of the practitioner. The medical note is intended as peer to peer communication and may appear blunt or direct. It is written in medical language and may contain abbreviations or verbiage that are unfamiliar.  If there are any questions or concerns please contact the dictating provider for clarification.

## 2024-09-04 NOTE — H&P
[facility]     Su Christensen Ace Patient Status:  Emergency    1945 MRN [id number]   [facility] EMERGENCY DEPARTMENT Attending Alex Collins MD   Hosp Day # 0 PCP Liliana Ybarra MD     Chief Complaint: Upper abdominal pain    Subjective:    History of Present Illness:     Su Christensen Ace is a 78 year old female with history of chronic intermittent dysphagia, esophageal stricture s/p dilation 2024, lymphocytic esophagitis on biopsy, COPD, depression presented with abdominal pain.     Patient was at her baseline health until 5 days prior to admission when she developed a right-sided abdominal pain.  She describes it as \"muscle cramp\".  It was tolerable at rest but exacerbated with specific movement or coughing.  She was evaluated by her PCP yesterday as part of her annual physical when she reported this pain.  Denies associated nausea, fevers, chills.  She does report night sweats for the past 2 nights.  Also describes 3 episodes daily of nonbloody diarrhea x 3 days.  Denies urinary frequency, dysuria, urgency. Patient had an outpatient CT done today which showed mild to moderate gallbladder distention and gallbladder wall thickening and mild pericholecystic inflammatory changes concerning for acute cholecystitis.  There is also dilatation of the common bile duct measuring 10 mm in diameter with a distal obstructing stone/stricture/mass.    Patient was afebrile, nontachycardic and normotensive on arrival to the ER.  CMP with mild metabolic acidosis with bicarb of 16.  AST, ALT, T bili within normal limits.  CBC is within normal limits.  Patient was given IV fluids, IV Dilaudid.  Surgery was consulted.    History/Other:    Past Medical History:  Past Medical History:    Body piercing    Ears    Calculus of kidney    COPD (chronic obstructive pulmonary disease) (HCC)    Decorative tattoo    Depression    Felicia Swain virus infection    High cholesterol    Slight  elevation    Painful swallowing    At times    Pneumonia due to organism    Problems with swallowing    Years ago    Renal disorder    Sinus infection     Past Surgical History:   Past Surgical History:   Procedure Laterality Date    Biopsy/removal, lymph node(s)      David biopsy stereo nodule 1 site right (cpt=19081)      2015    Tonsillectomy  1953      Family History:   Family History   Problem Relation Age of Onset    Cancer Father     Heart Disorder Father     Other (Other) Mother     Stroke Mother     Diabetes Maternal Grandmother     Other (Other) Maternal Grandmother      Social History:    reports that she quit smoking about 5 years ago. Her smoking use included cigarettes. She has a 35 pack-year smoking history. She has never used smokeless tobacco. She reports current alcohol use of about 14.0 standard drinks of alcohol per week. She reports that she does not use drugs.     Allergies:   Allergies   Allergen Reactions    Augmentin [Amoxicillin-Pot Clavulanate] ANAPHYLAXIS    Penicillins ANAPHYLAXIS    Keflex [Cephalexin] RASH       Medications:    Current Facility-Administered Medications on File Prior to Encounter   Medication Dose Route Frequency Provider Last Rate Last Admin    [COMPLETED] iopamidol 76% (ISOVUE-370) injection for power injector  80 mL Intravenous ONCE PRN Liliana Ybarra MD   80 mL at 09/03/24 1829     Current Outpatient Medications on File Prior to Encounter   Medication Sig Dispense Refill    pantoprazole 40 MG Oral Tab EC Take one tablet (40 mg total) by mouth once daily, 30 minutes prior to breakfast. 90 tablet 3    rosuvastatin 20 MG Oral Tab       Multiple Vitamin (DAILY VITES) Oral Tab Take 1 tablet by mouth daily.      sertraline 50 MG Oral Tab       VENTOLIN  (90 BASE) MCG/ACT Inhalation Aero Soln INHALE 2 PUFFS Q 4 H PRN  0       Review of Systems:   A comprehensive review of systems was completed.    Pertinent positives and negatives noted in the HPI.    Objective:    Physical Exam:    /77   Pulse 70   Temp 96.9 °F (36.1 °C) (Temporal)   Resp 16   Ht 5' 5\" (1.651 m)   Wt 155 lb (70.3 kg)   SpO2 100%   BMI 25.79 kg/m²   General: No acute distress, Alert  Respiratory: No rhonchi, no wheezes  Cardiovascular: S1, S2. Regular rate and rhythm  Abdomen: Soft, right upper quadrant tenderness to palpation, non-distended, positive bowel sounds  Neuro: No new focal deficits  Extremities: No edema    Results:    Labs:      Labs Last 24 Hours:    Recent Labs   Lab 09/03/24 1641 09/04/24  1332   RBC 4.78 4.63   HGB 16.0 15.5   HCT 45.6 43.7   MCV 95.4 94.4   MCH 33.5 33.5   MCHC 35.1 35.5   RDW 11.9 12.0   NEPRELIM 6.62 5.74   WBC 8.3 7.7   .0* 158.0       Recent Labs   Lab 09/03/24  1641 09/03/24  1809 09/04/24  1332   *  --  93   BUN 10  --  8*   CREATSERUM 0.79  --  0.72   EGFRCR 77 90 87   CA 9.9  --  10.1   ALB 5.2*  --  5.0*     --  137   K 4.7  --  4.3     --  106   CO2 28.0  --  16.0*   ALKPHO 84  --  79   AST 18  --  26   ALT 13  --  13   BILT 1.2*  --  1.1   TP 7.5  --  7.4       Lab Results   Component Value Date    INR 0.97 03/04/2019       No results for input(s): \"TROP\", \"TROPHS\", \"CK\" in the last 168 hours.    No results for input(s): \"TROP\", \"PBNP\" in the last 168 hours.    No results for input(s): \"PCT\" in the last 168 hours.    Imaging: Imaging data reviewed in Epic.    Assessment & Plan:      #Abdominal pain possibly secondary to cholecystitis/choledocholithiasis  -GI and surgery consults pending  -Keep NPO  -Supportive care  -Trend LFTs    #Incidental indeterminate liver lesion-patient will need a nonemergent dedicated MRI    #Chronic intermittent dysphagia  #Esophageal stricture s/p dilation 07/2024  -GI following    #COPD-clear breath sounds currently    #Depression-continue sertraline  #Hyperlipidemia-rosuvastatin    Plan of care discussed with patient    Maeve Ponce MD    Supplementary Documentation:     The 21st Century  Cures Act makes medical notes like these available to patients in the interest of transparency. Please be advised this is a medical document. Medical documents are intended to carry relevant information, facts as evident, and the clinical opinion of the practitioner. The medical note is intended as peer to peer communication and may appear blunt or direct. It is written in medical language and may contain abbreviations or verbiage that are unfamiliar.

## 2024-09-04 NOTE — ED QUICK NOTES
Orders for admission, patient is aware of plan and ready to go upstairs. Any questions, please call ED RN Jinny at extension [phone].     Patient Covid vaccination status: Unvaccinated     COVID Test Ordered in ED: None    COVID Suspicion at Admission: N/A    Running Infusions:    sodium chloride 125 mL/hr (09/04/24 1421)        Mental Status/LOC at time of transport: A&Ox4    Other pertinent information:   CIWA score: N/A   NIH score:  N/A

## 2024-09-04 NOTE — CONSULTS
[facility]                       Gastroenterology Consultation-[facility] Gastroenterology    Su Christensen Ace Patient Status:  Emergency    1945 MRN [id number]   Location [facility] EMERGENCY DEPARTMENT Attending Alex Collins MD   Hosp Day # 0 PCP Liliana Ybarra MD     Reason for consultation: CBD dilation   HPI: This is a 78 yr-old female with PMhx that includes dyslipidemia, COPD (no chronic oxygen requirement), anxiety, and dysphagia s/p EGD revealing mid-distal esophageal stricture (15 mm) s/p balloon dilation to 18 mm with bx revealing lymphocytic esophagitis (2024; Dr Mai) who presented to ER today with persistent RUQ pain. Pt reports developing fatigue on  which progressed to RUQ pain with slight radiation to back on . Pain described as a persistent cramping sensation and her symptoms progressed to night sweats without fever and non-bloody diarrhea (3x daily over the last 2 days). No nausea, vomiting, or fevers. No recent fluctuations in wt. No chronic anticoagulants or antiplatelet agents. Pt vapes nicotine daily and drinks 2-3 glasses of wine/day. Since her dilation, she reports only 1 episode of solid food dysphagia--markedly improved compared to her baseline.   CT a/p IV (9/3--completed by PCP for RUQ pain) suggests mild-mod gallbladder distention with gallbladder wall thickening + pericholecystic inflammatory changes c/w acute cholecystitis, dilation of CBD to 10 mm, and an indeterminate enhancing lesion in the dome of the liver; labs with normal LFTs, normal lipase, normal CBC.   Pt reports improved pain levels following Dilaudid IV and has remained hemodynamically stable.   PMHx:   Past Medical History:    Body piercing    Ears    Calculus of kidney    COPD (chronic obstructive pulmonary disease) (HCC)    Decorative tattoo    Depression    Felicia Swain virus infection    High cholesterol    Slight elevation    Painful swallowing    At times    Pneumonia due to organism     Problems with swallowing    Years ago    Renal disorder    Sinus infection                PSHx:   Past Surgical History:   Procedure Laterality Date    Biopsy/removal, lymph node(s)      David biopsy stereo nodule 1 site right (cpt=19081)          Tonsillectomy       Medications:    HYDROmorphone (Dilaudid) 1 MG/ML injection 0.5 mg  0.5 mg Intravenous Q30 Min PRN    sodium chloride 0.9% infusion  125 mL/hr Intravenous Continuous     Allergies:   Allergies   Allergen Reactions    Augmentin [Amoxicillin-Pot Clavulanate] ANAPHYLAXIS    Penicillins ANAPHYLAXIS    Keflex [Cephalexin] RASH     Social HX:   Social History     Socioeconomic History    Marital status:    Tobacco Use    Smoking status: Former     Current packs/day: 0.00     Average packs/day: 1 pack/day for 35.0 years (35.0 ttl pk-yrs)     Types: Cigarettes     Quit date: 9/15/2018     Years since quittin.9    Smokeless tobacco: Never    Tobacco comments:     Off and on   Substance and Sexual Activity    Alcohol use: Yes     Alcohol/week: 14.0 standard drinks of alcohol     Types: 14 Standard drinks or equivalent per week    Drug use: No      FamHx: The patient has no family history of colon cancer or other gastrointestinal malignancies;  No family history of ulcer disease, or inflammatory bowel disease  ROS:  In addition to the pertinent positives described above:            Infectious Disease:  No chronic infections or recent fevers prior to the acute illness            Cardiovascular: + dyslipidemia             Respiratory: + COPD            Hematologic: The patient reports no easy bruising, frequent gum bleeding or nose bleeding;  The patient has no history of known chronic anemia            Dermatologic: The patient reports no recent rashes or chronic skin disorders            Rheumatologic: The patient reports no history of chronic arthritis, myalgias, arthralgias            Genitourinary:  + nephrolithiasis           Psychiatric: +  depression           Oncologic: The patient reports no history of prior solid tumor or hematologic malignancy           ENT: The patient reports no hoarseness of voice, hearing loss, sinus congestion, tinnitus           Neurologic: The patient reports no history of seizure, stroke, or frequent headaches  PE: /77   Pulse 70   Temp 96.9 °F (36.1 °C) (Temporal)   Resp 16   Ht 5' 5\" (1.651 m)   Wt 155 lb (70.3 kg)   SpO2 100%   BMI 25.79 kg/m²   Gen: AAO x 3, able to speak in complete sentences  HENT: EOMI, PERRL, oropharynx is clear with moist mucosal membranes  Eyes: Sclerae are anicteric  Neck:  Supple without nuchal rigidity  CV: Regular rate and rhythm, with normal S1 and S2  Resp: Clear to auscultation bilaterally without wheezes; rubs, rhonchi, or rales  Abdomen: Soft, non-tender, non-distended with the presence of bowel sounds; No hepatosplenomegaly; no rebound or guarding; No ascites is clinically apparent; no tympany to percussion  Ext: No peripheral edema or cyanosis  Skin: Warm and dry  Psychiatric: Appropriate mood and congruent affect without obvious depression or anxiety  Labs:   Lab Results   Component Value Date    WBC 7.7 09/04/2024    HGB 15.5 09/04/2024    HCT 43.7 09/04/2024    .0 09/04/2024    CREATSERUM 0.72 09/04/2024    BUN 8 09/04/2024     09/04/2024    K 4.3 09/04/2024     09/04/2024    CO2 16.0 09/04/2024    GLU 93 09/04/2024    CA 10.1 09/04/2024    ALB 5.0 09/04/2024    ALKPHO 79 09/04/2024    BILT 1.1 09/04/2024    AST 26 09/04/2024    ALT 13 09/04/2024    PTT 34.7 09/04/2024    INR 1.12 09/04/2024    PTP 14.4 09/04/2024     Recent Labs   Lab 09/03/24  1641 09/04/24  1332   * 93   BUN 10 8*   CREATSERUM 0.79 0.72   CA 9.9 10.1    137   K 4.7 4.3    106   CO2 28.0 16.0*     Recent Labs   Lab 09/04/24  1332   RBC 4.63   HGB 15.5   HCT 43.7   MCV 94.4   MCH 33.5   MCHC 35.5   RDW 12.0   NEPRELIM 5.74   WBC 7.7   .0       Recent Labs    Lab 09/03/24  1641 09/04/24  1332   ALT 13 13   AST 18 26       Imaging:   PROCEDURE:  CT ABDOMEN+PELVIS (CONTRAST ONLY) (CPT=74177)     COMPARISON:  None.     INDICATIONS:  R19.7 Diarrhea R10.9 Abdominal pain     TECHNIQUE:  CT scanning was performed from the dome of the diaphragm to the pubic symphysis with non-ionic intravenous contrast material. Post contrast coronal MPR imaging was performed.  Dose reduction techniques were used. Dose information is  transmitted to the ACR (American College of Radiology) NRDR (National Radiology Data Registry) which includes the Dose Index Registry.     PATIENT STATED HISTORY:(As transcribed by Technologist)  Patient said she's had generalized abdominal pain for the last few days.       CONTRAST USED:  80cc of Isovue 370     FINDINGS:  LUNG BASE:  Scattered scarring/atelectasis  LIVER:  Indeterminate enhancing lesion in the dome the liver measuring 10 x 10 mm could be further characterized with a nonemergent dedicated MRI of the abdomen with and without Eovist contrast.  10 mm simple appearing cyst along the inferior right  hepatic lobe.  Subcentimeter hypodensity in left hepatic lobe measuring up to 6 mm is too small to further characterize and warrants no specific follow-up.  BILIARY:  Mild-to-moderate gallbladder distension with gallbladder wall thickening and mild pericholecystic inflammatory changes are concerning for changes of acute cholecystitis in the appropriate clinical setting.  Clinical correlation recommended.    Dedicated abdominal ultrasound and/or HIDA scan may be helpful for further evaluation as clinically directed.  There is also dilatation of the common bile duct measuring up to 10 mm in diameter with a distal obstructing stone, stricture, or mass not  excluded.  Clinical correlation recommended.  MRCP/ERCP may be helpful for further evaluation as clinically directed.  SPLEEN:  Subcentimeter hypodensities in the spleen measuring up to 8 mm are too small  to further characterize and warrant no specific follow-up.  PANCREAS:  Unremarkable.  ADRENALS:  Unremarkable.  KIDNEYS:  Subcentimeter hypodensities in the kidneys measuring up to 5 mm are too small to further characterize and warrant no specific follow-up.  AORTA/VASCULAR:  Moderate mixed plaque in the aorta and iliac arteries.  Ectatic abdominal aorta and iliac arteries.  RETROPERITONEUM:  Unremarkable.  BOWEL/MESENTERY:  Unremarkable appendix.  Uncomplicated colonic diverticulosis.  No large or small bowel dilatation.  No free air or free fluid.  ABDOMINAL WALL:  Unremarkable.  PELVIC ORGANS:  Unremarkable urinary bladder.  Vascular calcifications along the uterus.  Unremarkable ovaries.  LYMPH NODES:  No lymphadenopathy in the abdomen or pelvis.  BONES:  Degenerative changes in the spine and hips.  OTHER:  None.       Impression   CONCLUSION:       1. Mild-to-moderate gallbladder distension with gallbladder wall thickening and mild pericholecystic inflammatory changes are concerning for changes of acute cholecystitis in the appropriate clinical setting.  Clinical correlation recommended.  Dedicated   abdominal ultrasound and/or HIDA scan may be helpful for further evaluation as clinically directed.       2. There is also dilatation of the common bile duct measuring up to 10 mm in diameter with a distal obstructing stone, stricture, or mass not excluded.  Clinical correlation recommended.  MRCP/ERCP may be helpful for further evaluation as clinically  directed.     3. Uncomplicated colonic diverticulosis.     4. Indeterminate enhancing lesion in the dome the liver measuring 10 x 10 mm could be further characterized with a nonemergent dedicated MRI of the abdomen with and without Eovist contrast.       Please see above for further details.     The radiology support staff is in the process of contacting the referring physician regarding this report.     LOCATION:  [place]        Dictated by (CST): Wilmar Cortez  MD on 9/03/2024 at 6:37 PM      Finalized by (CST): Wilmar Cortez MD on 9/03/2024 at 6:43 PM      Impression: 78 yr-old female with hx of dyslipidemia, COPD (no chronic oxygen requirement), anxiety, and dysphagia s/p EGD revealing mid-distal esophageal stricture (15 mm) s/p balloon dilation to 18 mm with bx revealing lymphocytic esophagitis (7/2024; Dr Mai) admitted today with persistent RUQ pain which started 8/31 and progressed to non-bloody diarrhea and night sweats   CT a/p IV (9/3) suggests mild-mod gallbladder distention with gallbladder wall thickening + pericholecystic inflammatory changes c/w acute cholecystitis, dilation of CBD to 10 mm, and an indeterminate enhancing lesion in the dome of the liver; labs with normal LFTs, normal lipase, normal CBC. CBD dilation may be d/t choledocholithiasis however pt may have passed a stone. Given normal LFTs and esophageal stricture will plan for MRCP to assess for biliary obstruction and if needed plan for an ERCP  Recommendations:     STAT MRI abd/MRCP--if choledocholithiasis noted then plan for ERCP under MAC with Dr Mai  General surgery evaluation to determine timing of cholecystectomy   Pain management per Hospitalist recommendations; antiemetics as needed  Contact GI if fevers develop or pain worsens despite narcotics   Stool for C Diff + GI PCR panel; enteric isolation   Abx per general surgery recommendations   Consider timing of an MRI liver with EOVIST to assess liver lesions   CBC, CMP in AM correcting electrolytes as needed per Hospitalist recommendations     Thank you for the consultation, we will follow the patient with you.  Attending addendum (Dr Forbes) to follow later today and provide formal, final recommendations at that time   BISMARK Pickett  3:05 PM  9/4/2024  [facility] Gastroenterology  [phone]    This patient was seen in conjunction with Yvette Marie NP. I have personally seen and examined the patient and have  discussed the above stated diagnosis and treatment plan with my edits above. The patient is a 78 year old female who presented with RUQ pain, with imaging findings concerning for acute cholecystitis and CBD dilation without definitive evidence of cholecystitis. Her LFTs are normal. On exam, she has RUQ tenderness without rebounding or guarding. Recommend surgery consult, antibiotics, and MRI MRCP to further evaluate if choledocholithiasis trending. Continue to trend CMP.    Deven Forbes MD  [facility] Gastroenterology

## 2024-09-04 NOTE — ED PROVIDER NOTES
Patient Seen in: [facility] Emergency Department      History     Chief Complaint   Patient presents with    Abdomen/Flank Pain     Stated Complaint: + abd pain Denies NVD. Had CT abd yesterday sent for eval for cholescystitis    Subjective:   HPI    Patient is a 78-year-old female who states for the past 3 days she has had right upper quadrant abdominal pain.  Patient states she had nausea but no vomiting.  Patient states she did not have a fever but was sweating at night.  Patient states pain is 5-6 out of 10.  Patient states is dull ache, no radiation.  No chest pain, shortness of breath.  No hematuria or dysuria, no diarrhea.  Patient saw her doctor yesterday had outpatient blood work and outpatient CT showing cholecystitis.  Her doctor recommended she come last night but patient waited and came in today.  Patient states she is still having pain.  Patient has not had anything to eat since Monday.  Remainder of review of systems negative.    Objective:   Past Medical History:    Body piercing    Ears    Calculus of kidney    COPD (chronic obstructive pulmonary disease) (HCC)    Decorative tattoo    Depression    Felicia Swain virus infection    High cholesterol    Slight elevation    Painful swallowing    At times    Pneumonia due to organism    Problems with swallowing    Years ago    Renal disorder    Sinus infection              Past Surgical History:   Procedure Laterality Date    Biopsy/removal, lymph node(s)      David biopsy stereo nodule 1 site right (cpt=19081)      2015    Tonsillectomy  1953                Social History     Socioeconomic History    Marital status:    Tobacco Use    Smoking status: Former     Current packs/day: 0.00     Average packs/day: 1 pack/day for 35.0 years (35.0 ttl pk-yrs)     Types: Cigarettes     Quit date: 9/15/2018     Years since quittin.9    Smokeless tobacco: Never    Tobacco comments:     Off and on   Substance and Sexual Activity    Alcohol use: Yes      Alcohol/week: 14.0 standard drinks of alcohol     Types: 14 Standard drinks or equivalent per week    Drug use: No          Patient does vape, former smoker.  Patient alcohol half bottle of wine a day.  No drugs    Review of Systems    Positive for stated Chief Complaint: Abdomen/Flank Pain    Other systems are as noted in HPI.  Constitutional and vital signs reviewed.      All other systems reviewed and negative except as noted above.    Physical Exam     ED Triage Vitals [09/04/24 1221]   /88   Pulse 95   Resp 16   Temp 96.9 °F (36.1 °C)   Temp src Temporal   SpO2 95 %   O2 Device None (Room air)       Current Vitals:   Vital Signs  BP: 133/77  Pulse: 70  Resp: 16  Temp: 96.9 °F (36.1 °C)  Temp src: Temporal  MAP (mmHg): 95    Oxygen Therapy  SpO2: 100 %  O2 Device: None (Room air)            Physical Exam  GENERAL: Patient resting on the cart in no acute distress.  HEENT: Extraocular muscles intact, pupils equal round reactive to light.  Mouth normal, neck supple, no meningismus.  LUNGS: Lungs clear to auscultation bilaterally.  CARDIOVASCULAR: + S1-S2, regular rate and rhythm, no murmurs.  BACK: No CVA tenderness, no midline bony tenderness.  ABDOMEN: + Bowel sounds, soft, moderate tenderness right upper quadrant, nondistended.  No rebound, no guarding, no hepatosplenomegaly.  EXTREMITIES: Full range of motion, no tenderness, good capillary refill.  SKIN: No rash, good turgor.  NEURO: Patient answers questions appropriately.  No focal deficits appreciated.           ED Course     Labs Reviewed   COMP METABOLIC PANEL (14) - Abnormal; Notable for the following components:       Result Value    CO2 16.0 (*)     BUN 8 (*)     Albumin 5.0 (*)     A/G Ratio 2.1 (*)     All other components within normal limits   LIPASE - Normal   PROTHROMBIN TIME (PT) - Normal   PTT, ACTIVATED - Normal   CBC WITH DIFFERENTIAL WITH PLATELET   RAINBOW DRAW LAVENDER   RAINBOW DRAW LIGHT GREEN             Outpatient CT from  yesterday  1. Mild-to-moderate gallbladder distension with gallbladder wall thickening and mild pericholecystic inflammatory changes are concerning for changes of acute cholecystitis in the appropriate clinical setting.  Clinical correlation recommended.  Dedicated    abdominal ultrasound and/or HIDA scan may be helpful for further evaluation as clinically directed.       2. There is also dilatation of the common bile duct measuring up to 10 mm in diameter with a distal obstructing stone, stricture, or mass not excluded.  Clinical correlation recommended.  MRCP/ERCP may be helpful for further evaluation as clinically   directed.      3. Uncomplicated colonic diverticulosis.      4. Indeterminate enhancing lesion in the dome the liver measuring 10 x 10 mm could be further characterized with a nonemergent dedicated MRI of the abdomen with and without Eovist contrast.              MDM      Patient is NPO.  Patient was given pain medicines.  I did speak with general surgery recommended admission and they will evaluate the patient.  [facility]  I did consider acute cholecystitis, choledocholithiasis, pancreatitis.  Surgery did not recommend antibiotics at this time.  Admission disposition: 9/4/2024  3:41 PM                                        Medical Decision Making      Disposition and Plan     Clinical Impression:  1. Acute cholecystitis         Disposition:  Admit  9/4/2024  3:41 pm    Follow-up:  No follow-up provider specified.        Medications Prescribed:  Current Discharge Medication List                            Hospital Problems       Present on Admission  Date Reviewed: 8/22/2024            ICD-10-CM Noted POA    * (Principal) Acute cholecystitis K81.0 9/4/2024 Unknown

## 2024-09-04 NOTE — PLAN OF CARE
Received patient from ED.   Alert, awake. Breathing unlabored. C/o abdominal pain. Awaiting MRI to be completed.

## 2024-09-04 NOTE — ED INITIAL ASSESSMENT (HPI)
Pt c/o upper abdominal pain since Sunday. Had outpt CT scan done yesterday and showed concern for cholecystitis, sent for further eval.

## 2024-09-05 ENCOUNTER — ANESTHESIA (OUTPATIENT)
Dept: ENDOSCOPY | Facility: HOSPITAL | Age: 79
End: 2024-09-05
Payer: MEDICARE

## 2024-09-05 ENCOUNTER — APPOINTMENT (OUTPATIENT)
Dept: GENERAL RADIOLOGY | Facility: HOSPITAL | Age: 79
End: 2024-09-05
Attending: INTERNAL MEDICINE
Payer: MEDICARE

## 2024-09-05 ENCOUNTER — ANESTHESIA EVENT (OUTPATIENT)
Dept: ENDOSCOPY | Facility: HOSPITAL | Age: 79
End: 2024-09-05
Payer: MEDICARE

## 2024-09-05 ENCOUNTER — APPOINTMENT (OUTPATIENT)
Dept: GENERAL RADIOLOGY | Facility: HOSPITAL | Age: 79
End: 2024-09-05
Attending: SURGERY
Payer: MEDICARE

## 2024-09-05 PROBLEM — K83.8 INTRAHEPATIC BILE DUCT DILATION: Status: ACTIVE | Noted: 2024-09-05

## 2024-09-05 PROBLEM — R10.84 GENERALIZED ABDOMINAL PAIN: Status: ACTIVE | Noted: 2024-09-05

## 2024-09-05 PROBLEM — N30.00 ACUTE CYSTITIS WITHOUT HEMATURIA: Status: ACTIVE | Noted: 2024-09-05

## 2024-09-05 PROBLEM — K86.89: Status: ACTIVE | Noted: 2024-09-05

## 2024-09-05 PROBLEM — K83.8 COMMON BILE DUCT DILATION: Status: ACTIVE | Noted: 2024-09-05

## 2024-09-05 LAB
ALBUMIN SERPL-MCNC: 4.1 G/DL (ref 3.2–4.8)
ALBUMIN/GLOB SERPL: 2.3 {RATIO} (ref 1–2)
ALP LIVER SERPL-CCNC: 173 U/L
ALT SERPL-CCNC: 158 U/L
ANION GAP SERPL CALC-SCNC: 6 MMOL/L (ref 0–18)
AST SERPL-CCNC: 217 U/L (ref ?–34)
BASOPHILS # BLD AUTO: 0.03 X10(3) UL (ref 0–0.2)
BASOPHILS NFR BLD AUTO: 0.5 %
BILIRUB SERPL-MCNC: 1.2 MG/DL (ref 0.2–1.1)
BUN BLD-MCNC: 8 MG/DL (ref 9–23)
CALCIUM BLD-MCNC: 9 MG/DL (ref 8.7–10.4)
CHLORIDE SERPL-SCNC: 110 MMOL/L (ref 98–112)
CO2 SERPL-SCNC: 25 MMOL/L (ref 21–32)
CREAT BLD-MCNC: 0.66 MG/DL
EGFRCR SERPLBLD CKD-EPI 2021: 91 ML/MIN/1.73M2 (ref 60–?)
EOSINOPHIL # BLD AUTO: 0.1 X10(3) UL (ref 0–0.7)
EOSINOPHIL NFR BLD AUTO: 1.7 %
ERYTHROCYTE [DISTWIDTH] IN BLOOD BY AUTOMATED COUNT: 12 %
GLOBULIN PLAS-MCNC: 1.8 G/DL (ref 2–3.5)
GLUCOSE BLD-MCNC: 94 MG/DL (ref 70–99)
HCT VFR BLD AUTO: 38.5 %
HGB BLD-MCNC: 13.1 G/DL
IMM GRANULOCYTES # BLD AUTO: 0.02 X10(3) UL (ref 0–1)
IMM GRANULOCYTES NFR BLD: 0.3 %
LYMPHOCYTES # BLD AUTO: 0.98 X10(3) UL (ref 1–4)
LYMPHOCYTES NFR BLD AUTO: 17.1 %
MAGNESIUM SERPL-MCNC: 1.8 MG/DL (ref 1.6–2.6)
MCH RBC QN AUTO: 33.7 PG (ref 26–34)
MCHC RBC AUTO-ENTMCNC: 34 G/DL (ref 31–37)
MCV RBC AUTO: 99 FL
MONOCYTES # BLD AUTO: 0.47 X10(3) UL (ref 0.1–1)
MONOCYTES NFR BLD AUTO: 8.2 %
NEUTROPHILS # BLD AUTO: 4.12 X10 (3) UL (ref 1.5–7.7)
NEUTROPHILS # BLD AUTO: 4.12 X10(3) UL (ref 1.5–7.7)
NEUTROPHILS NFR BLD AUTO: 72.2 %
OSMOLALITY SERPL CALC.SUM OF ELEC: 290 MOSM/KG (ref 275–295)
PHOSPHATE SERPL-MCNC: 3.6 MG/DL (ref 2.4–5.1)
PLATELET # BLD AUTO: 131 10(3)UL (ref 150–450)
POTASSIUM SERPL-SCNC: 4.3 MMOL/L (ref 3.5–5.1)
PROT SERPL-MCNC: 5.9 G/DL (ref 5.7–8.2)
RBC # BLD AUTO: 3.89 X10(6)UL
SODIUM SERPL-SCNC: 141 MMOL/L (ref 136–145)
WBC # BLD AUTO: 5.7 X10(3) UL (ref 4–11)

## 2024-09-05 PROCEDURE — 99232 SBSQ HOSP IP/OBS MODERATE 35: CPT | Performed by: STUDENT IN AN ORGANIZED HEALTH CARE EDUCATION/TRAINING PROGRAM

## 2024-09-05 PROCEDURE — 0FB98ZX EXCISION OF COMMON BILE DUCT, VIA NATURAL OR ARTIFICIAL OPENING ENDOSCOPIC, DIAGNOSTIC: ICD-10-PCS | Performed by: INTERNAL MEDICINE

## 2024-09-05 PROCEDURE — 0F798ZZ DILATION OF COMMON BILE DUCT, VIA NATURAL OR ARTIFICIAL OPENING ENDOSCOPIC: ICD-10-PCS | Performed by: INTERNAL MEDICINE

## 2024-09-05 PROCEDURE — 99232 SBSQ HOSP IP/OBS MODERATE 35: CPT | Performed by: SURGERY

## 2024-09-05 PROCEDURE — 0DJ08ZZ INSPECTION OF UPPER INTESTINAL TRACT, VIA NATURAL OR ARTIFICIAL OPENING ENDOSCOPIC: ICD-10-PCS | Performed by: INTERNAL MEDICINE

## 2024-09-05 PROCEDURE — 74328 X-RAY BILE DUCT ENDOSCOPY: CPT | Performed by: INTERNAL MEDICINE

## 2024-09-05 RX ORDER — LEVOFLOXACIN 750 MG/1
750 TABLET, FILM COATED ORAL DAILY
Qty: 6 TABLET | Refills: 0 | Status: SHIPPED | OUTPATIENT
Start: 2024-09-05 | End: 2024-09-06

## 2024-09-05 RX ORDER — SODIUM CHLORIDE, SODIUM LACTATE, POTASSIUM CHLORIDE, CALCIUM CHLORIDE 600; 310; 30; 20 MG/100ML; MG/100ML; MG/100ML; MG/100ML
INJECTION, SOLUTION INTRAVENOUS CONTINUOUS PRN
Status: DISCONTINUED | OUTPATIENT
Start: 2024-09-05 | End: 2024-09-05 | Stop reason: SURG

## 2024-09-05 RX ORDER — ONDANSETRON 2 MG/ML
4 INJECTION INTRAMUSCULAR; INTRAVENOUS ONCE AS NEEDED
Status: DISCONTINUED | OUTPATIENT
Start: 2024-09-05 | End: 2024-09-05 | Stop reason: HOSPADM

## 2024-09-05 RX ORDER — LIDOCAINE HYDROCHLORIDE 10 MG/ML
INJECTION, SOLUTION EPIDURAL; INFILTRATION; INTRACAUDAL; PERINEURAL AS NEEDED
Status: DISCONTINUED | OUTPATIENT
Start: 2024-09-05 | End: 2024-09-05 | Stop reason: SURG

## 2024-09-05 RX ORDER — NALOXONE HYDROCHLORIDE 0.4 MG/ML
0.08 INJECTION, SOLUTION INTRAMUSCULAR; INTRAVENOUS; SUBCUTANEOUS ONCE AS NEEDED
Status: DISCONTINUED | OUTPATIENT
Start: 2024-09-05 | End: 2024-09-05 | Stop reason: HOSPADM

## 2024-09-05 RX ORDER — MAGNESIUM SULFATE HEPTAHYDRATE 40 MG/ML
2 INJECTION, SOLUTION INTRAVENOUS ONCE
Status: COMPLETED | OUTPATIENT
Start: 2024-09-05 | End: 2024-09-05

## 2024-09-05 RX ORDER — SODIUM CHLORIDE, SODIUM LACTATE, POTASSIUM CHLORIDE, CALCIUM CHLORIDE 600; 310; 30; 20 MG/100ML; MG/100ML; MG/100ML; MG/100ML
INJECTION, SOLUTION INTRAVENOUS CONTINUOUS
Status: DISCONTINUED | OUTPATIENT
Start: 2024-09-05 | End: 2024-09-05

## 2024-09-05 RX ORDER — LEVOFLOXACIN 5 MG/ML
750 INJECTION, SOLUTION INTRAVENOUS EVERY 24 HOURS
Status: DISCONTINUED | OUTPATIENT
Start: 2024-09-05 | End: 2024-09-06

## 2024-09-05 RX ORDER — INDOMETHACIN 100 MG
SUPPOSITORY, RECTAL RECTAL
Status: DISPENSED
Start: 2024-09-05 | End: 2024-09-06

## 2024-09-05 RX ADMIN — SODIUM CHLORIDE, SODIUM LACTATE, POTASSIUM CHLORIDE, CALCIUM CHLORIDE: 600; 310; 30; 20 INJECTION, SOLUTION INTRAVENOUS at 15:52:00

## 2024-09-05 RX ADMIN — LIDOCAINE HYDROCHLORIDE 50 MG: 10 INJECTION, SOLUTION EPIDURAL; INFILTRATION; INTRACAUDAL; PERINEURAL at 15:30:00

## 2024-09-05 RX ADMIN — SODIUM CHLORIDE, SODIUM LACTATE, POTASSIUM CHLORIDE, CALCIUM CHLORIDE: 600; 310; 30; 20 INJECTION, SOLUTION INTRAVENOUS at 15:26:00

## 2024-09-05 NOTE — OPERATIVE REPORT
Su Christensen Ace Patient Status:  Observation    1945 MRN [id number]   [facility] ENDOSCOPY PAIN CENTER Attending Jsolyn Belcher, DO   Date 2024 PCP Liliana Ybarra MD     PREOPERATIVE DIAGNOSIS/INDICATION: Abnormal LFT's, abnormal imaging of biliary tree CT/MRI   POSTOPERTATIVE DIAGNOSIS: Distal CBD lesion  PROCEDURE PERFORMED: EUS/EGD  TIME OUT WAS PERFORMED    SEDATION: MAC sedation provided by General Anesthesia    INFORMED CONSENT: Risks, benefits and alternatives to the procedure were explained to the patient including but not limited to bleeding, infection, perforation, adverse drug reactions, pancreatitis and the need for hospitalization and surgery if this occurs, the patient understands and agrees to procedure.  PROCEDURE DESCRIPTION: The therapeutic side viewing echoendoscope was introduced into the patient’s mouth, hypo pharynx, esophagus, stomach and the first and second portion of the duodenum, straightening of the endoscope was performed to obtain a direct view of the major ampulla. Careful but limited examination of the above described areas was performed on withdrawal of the endoscope. The patient tolerated the procedure well and there were no immediate complications noted during the procedure, the patient was transported to the recovery area in stable condition.  FINDINGS:  DUODENUM: Normal  MAJOR AMPULLA: Normal  ECHO: Imaging was performed through the esophagus, stomach and duodenum. The CBD showed dilatation and sludge, the distal CBD showed a 9 mm hypoechoic lesion, the CHD, the confluence of the right and left hepatic ducts and the intrahepatics were mildly dilated   THERAPEUTICS: None  RECOMMENDATIONS:   Post EUS/EGD precautions, watch for bleeding, infection, perforation, adverse drug reactions and pancreatitis.  Call status report post procedure.      Elijah Mai MD  2024  4:01 PM

## 2024-09-05 NOTE — PLAN OF CARE
Assumed care at 1930  MRI/MRCP abd done.  C/o right sided abdominal pain.Dilaudid given with relief.  Denies nausea.  Afebrile.  Refused Lovenox inspite of explaining use of it.  Plan:ERCP?Lap melissa.  Problem: GASTROINTESTINAL - ADULT  Goal: Minimal or absence of nausea and vomiting  Description: INTERVENTIONS:  - Maintain adequate hydration with IV or PO as ordered and tolerated  - Nasogastric tube to low intermittent suction as ordered  - Evaluate effectiveness of ordered antiemetic medications  - Provide nonpharmacologic comfort measures as appropriate  - Advance diet as tolerated, if ordered  - Obtain nutritional consult as needed  - Evaluate fluid balance  Outcome: Progressing  Goal: Maintains or returns to baseline bowel function  Description: INTERVENTIONS:  - Assess bowel function  - Maintain adequate hydration with IV or PO as ordered and tolerated  - Evaluate effectiveness of GI medications  - Encourage mobilization and activity  - Obtain nutritional consult as needed  - Establish a toileting routine/schedule  - Consider collaborating with pharmacy to review patient's medication profile  Outcome: Progressing     Problem: Patient/Family Goals  Goal: Patient/Family Long Term Goal  Description: Patient's Long Term Goal:go home    Interventions:  -consult  -pain controlled  -tolerate diet  -follow up care  - See additional Care Plan goals for specific interventions  Outcome: Progressing  Goal: Patient/Family Short Term Goal  Description: Patient's Short Term Goal: be comfortable    Interventions:   - pain medicine as needed  - See additional Care Plan goals for specific interventions  Outcome: Progressing

## 2024-09-05 NOTE — ANESTHESIA POSTPROCEDURE EVALUATION
[facility]    Su Christensen Ace Patient Status:  Observation   Age/Gender 78 year old female MRN [id number]   Location [facility] ENDOSCOPY PAIN CENTER Attending Joslyn Belcher DO   Hosp Day # 0 PCP Liliana Ybarra MD       Anesthesia Post-op Note    ENDOSCOPIC ULTRASOUND (EUS), ENDOSCOPIC RETROGRADE CHOLANGIOPANCREATOGRAPHY (ERCP) WITH SPHINCTEROTOMY, BALLOON SWEEP, BIOPSIES    Procedure Summary       Date: 09/05/24 Room / Location:  ENDOSCOPY 01 / [facility]    Anesthesia Start: 1526 Anesthesia Stop:     Procedures:       ENDOSCOPIC ULTRASOUND (EUS), ENDOSCOPIC RETROGRADE CHOLANGIOPANCREATOGRAPHY (ERCP) WITH SPHINCTEROTOMY, BALLOON SWEEP, BIOPSIES      ENDOSCOPIC RETROGRADE CHOLANGIOPANCREATOGRAPHY (ERCP) Diagnosis: (AMPULLARY MASS)    Surgeons: Elijah Mai MD Anesthesiologist: Kais Rubio MD    Anesthesia Type: MAC ASA Status: 2            Anesthesia Type: MAC    Vitals Value Taken Time   /64 09/05/24 1557   Temp available 09/05/24 1557   Pulse 91 09/05/24 1557   Resp 16 09/05/24 1557   SpO2 99% 09/05/24 1557       Patient Location: Endoscopy    Anesthesia Type: MAC    Airway Patency: patent    Postop Pain Control: adequate    Mental Status: mildly sedated but able to meaningfully participate in the post-anesthesia evaluation    Nausea/Vomiting: none    Cardiopulmonary/Hydration status: stable euvolemic    Complications: no apparent anesthesia related complications    Postop vital signs: stable    Dental Exam: Unchanged from Preop    Patient to be discharged from PACU when criteria met.

## 2024-09-05 NOTE — PROGRESS NOTES
[facility]   part of [facility]     Hospitalist Progress Note     Su Christensen Ace Patient Status:  Observation    1945 MRN [id number]   Location [facility] 0SW-A Attending Joslyn Belcher, DO   Hosp Day # 0 PCP Liliana Ybarra MD     Chief Complaint: Abdominal pain    Subjective:     Patient resting in bed and reports some abdominal discomfort.  She denies any nausea, vomiting    Objective:    Review of Systems:   A comprehensive review of systems was completed; pertinent positive and negatives stated in subjective.    Vital signs:  Temp:  [96.9 °F (36.1 °C)-98.4 °F (36.9 °C)] 98.4 °F (36.9 °C)  Pulse:  [63-95] 75  Resp:  [16-18] 16  BP: ()/(54-88) 117/62  SpO2:  [93 %-100 %] 93 %    Physical Exam:    General: No acute distress  Respiratory: No wheezes, no rhonchi  Cardiovascular: S1, S2, regular rate and rhythm  Abdomen: Soft, Non-tender, non-distended, positive bowel sounds  Neuro: No new focal deficits.   Extremities: No edema    Diagnostic Data:    Labs:  Recent Labs   Lab 24  16424  1332 24  1419 24  0550   WBC 8.3 7.7  --  5.7   HGB 16.0 15.5  --  13.1   MCV 95.4 94.4  --  99.0   .0* 158.0  --  131.0*   INR  --   --  1.12  --        Recent Labs   Lab 24  16424  1332 24  0550   * 93 94   BUN 10 8* 8*   CREATSERUM 0.79 0.72 0.66   CA 9.9 10.1 9.0   ALB 5.2* 5.0* 4.1    137 141   K 4.7 4.3 4.3    106 110   CO2 28.0 16.0* 25.0   ALKPHO 84 79 173*   AST 18 26 217*   ALT 13 13 158*   BILT 1.2* 1.1 1.2*   TP 7.5 7.4 5.9       Estimated Creatinine Clearance: 65.3 mL/min (based on SCr of 0.66 mg/dL).    No results for input(s): \"TROP\", \"TROPHS\", \"CK\" in the last 168 hours.    Recent Labs   Lab 24  1419   PTP 14.4   INR 1.12                  Microbiology    No results found for this visit on 24.      Imaging: Reviewed in Epic.    Medications:    levofloxacin  750 mg Intravenous Q24H    sertraline  25 mg Oral  Daily    rosuvastatin  20 mg Oral Daily    [Held by provider] enoxaparin  40 mg Subcutaneous Daily       Assessment & Plan:      #Acute abdominal pain  #Dilated common bile duct, pancreatic duct, intrahepatic duct  -CT abdomen pelvis reviewed  -MRCP reviewed  -GI panel in lab  -C. difficile in lab  -Plan for ERCP today  -GI following  -Surgery following    #UTI  -Urine culture in lab  -IV antibiotics    #Incidental indeterminate liver lesion  -Patient will need nonemergent dedicated MRI    #Chronic intermittent dysphagia  #Esophageal stricture s/p dilation 07/2024  -GI following    #COPD    #Hyperlipidemia  -Statin    #Depression  -Sertraline      Joslyn Belcher,     Supplementary Documentation:     Quality:  DVT Mechanical Prophylaxis:   SCDs, Early ambuation  DVT Pharmacologic Prophylaxis   Medication    [Held by provider] enoxaparin (Lovenox) 40 MG/0.4ML SUBQ injection 40 mg                Code Status: Full Code  Adam: No urinary catheter in place  Adam Duration (in days):   Central line:    MITA: 9/6/2024    Discharge is dependent on: Clinical improvement  At this point Ms. Murillo is expected to be discharge to: Home    The 21st Century Cures Act makes medical notes like these available to patients in the interest of transparency. Please be advised this is a medical document. Medical documents are intended to carry relevant information, facts as evident, and the clinical opinion of the practitioner. The medical note is intended as peer to peer communication and may appear blunt or direct. It is written in medical language and may contain abbreviations or verbiage that are unfamiliar.              **Certification      PHYSICIAN Certification of Need for Inpatient Hospitalization - Initial Certification    Patient will require inpatient services that will reasonably be expected to span two midnight's based on the clinical documentation in H+P.   Based on patients current state of illness, I anticipate that, after  discharge, patient will require TBD.

## 2024-09-05 NOTE — OPERATIVE REPORT
Su Dellaon Ace Patient Status:  Observation    1945 MRN [id number]   [facility] Attending Joslyn Belcher, DO   Date 2024 PCP Liliana Ybarra MD     PREOPERATIVE DIAGNOSIS/INDICATION: Abnormal LFT's, abnormal imaging of biliary tree CT/MRI  POSTOPERTATIVE DIAGNOSIS: Distal bile duct lesion r/o malignancy, sludge  PROCEDURE PERFORMED: ERCP with biliary sphincterotomy, balloon sweep and biopsy  TIME OUT WAS PERFORMED    SEDATION: MAC sedation provided by General Anesthesia    INFORMED CONSENT: Risks, benefits and alternatives to the procedure were explained to the patient including but not limited to bleeding, infection, perforation, adverse drug reactions, pancreatitis and the need for hospitalization and surgery if this occurs, the patient understands and agrees to procedure.  PROCEDURE DESCRIPTION: The therapeutic side viewing duodenoscope was introduced into the patient’s mouth, hypo pharynx, esophagus, stomach and the first and second portion of the duodenum, straightening of the endoscope was performed to obtain a direct view of the major ampulla. Careful but limited examination of the above described areas was performed on withdrawal of the endoscope. The patient tolerated the procedure well and there were no immediate complications noted during the procedure, the patient was transported to the recovery area in stable condition.  FINDINGS:  DUODENUM: Normal  MAJOR AMPULLA:Normal  ERP: Not attempted  ERC: The CBD showed dilatation and sludge, the distal CBD showed a fleshy friable lesion prolapsing through the ampulla with balloon sweep, the CHD, the confluence of the right and left hepatic ducts and the intrahepatics were mildly dilated, the cystic duct and the gallbladder were not visualized  THERAPEUTICS: The CBD was cannulated using a standard 0.035 [place] [employer]  Hydratome RX44, 20mm cut wire, deep cannulation was performed with the help of a 0.035  straight Acrobat wire 260cm in length, an adequate biliary sphincterotomy was performed with standard ERBE settings, there were no immediate complication noted. Biliary balloon stone extraction was performed the help of [employer]’s 9-12mm triple lumen stone extraction balloon, sludge was retrieved. Distal bile duct mass biopsies were performed with cold forceps  RECOMMENDATIONS:   Post ERCP precautions, watch for bleeding, infection, perforation, adverse drug reactions and pancreatitis.  CMP, CBC in AM.  Call status report post procedure.  Follow biopsies.    Elijah Mai MD  9/5/2024  3:57 PM

## 2024-09-05 NOTE — PROGRESS NOTES
Inpatient Follow up Note    Su Christensen Ace Patient Status:  Observation    1945 MRN [id number]   Location [facility] 0SW-A Attending Joslyn Belcher, DO   Hosp Day # 0 PCP Liliana Ybarra MD     Reason for Consultation   RUQ pain, dilated bile ducts/PD     Subjective   Reports improved RUQ pain but still there, denies fevers/chills/vomiting, MRCP without choledocholithiasis but does show dilated PD, CBD, and IH ducts.             Objective:     BP 90/54 (BP Location: Left arm)   Pulse 66   Temp 98 °F (36.7 °C) (Oral)   Resp 16   Ht 5' 5\" (1.651 m)   Wt 155 lb (70.3 kg)   SpO2 98%   BMI 25.79 kg/m²   Gen: AAOx3  CV: RRR with normal S1 / S2  Resp: CTA bilaterally  Abd: (+)BS, soft, non-tender, non-distended; no rebound or guarding  Ext: No edema or cyanosis  Skin: Warm and dry     Labs/Imaging     LABRCNTIP[PGLU:5@  Recent Labs   Lab 24  1419   INR 1.12         Recent Labs   Lab 24  1332 24  0550   WBC 8.3 7.7 5.7   HGB 16.0 15.5 13.1   .0* 158.0 131.0*       Recent Labs   Lab 24  1332 24  0550    137 141   K 4.7 4.3 4.3    106 110   CO2 28.0 16.0* 25.0   BUN 10 8* 8*   CREATSERUM 0.79 0.72 0.66       Recent Labs   Lab 24  1332 24  0550   ALT 13 13 158*   AST 18 26 217*     MRI ABDOMEN AND MRCP W/3D (W+W0)(CPT=74183/89260)    Result Date: 2024  CONCLUSION:   1.  Dilated intrahepatic ducts,  common bile duct, and pancreatic duct without definite evidence of mass lesion or stone.  This finding could represent a benign or malignant stricture within the distal duct versus an ampullary tumor.  Further evaluation with ERCP is recommended.  2. Liver lesions representing hemangioma at the dome and small cysts within the remainder of the liver.  LOCATION:  [facility]    Dictated by (CST): Nadir Anglin MD on 2024 at 9:58 PM     Finalized by (CST): Nadir Anglin MD on 2024 at 10:05 PM        CT ABDOMEN+PELVIS(CONTRAST ONLY)(CPT=74177)    Result Date: 9/3/2024  CONCLUSION:   1. Mild-to-moderate gallbladder distension with gallbladder wall thickening and mild pericholecystic inflammatory changes are concerning for changes of acute cholecystitis in the appropriate clinical setting.  Clinical correlation recommended.  Dedicated  abdominal ultrasound and/or HIDA scan may be helpful for further evaluation as clinically directed.   2. There is also dilatation of the common bile duct measuring up to 10 mm in diameter with a distal obstructing stone, stricture, or mass not excluded.  Clinical correlation recommended.  MRCP/ERCP may be helpful for further evaluation as clinically directed.  3. Uncomplicated colonic diverticulosis.  4. Indeterminate enhancing lesion in the dome the liver measuring 10 x 10 mm could be further characterized with a nonemergent dedicated MRI of the abdomen with and without Eovist contrast.    Please see above for further details.  The radiology support staff is in the process of contacting the referring physician regarding this report.  LOCATION:  [facility]   Dictated by (CST): Wilmar Cortez MD on 9/03/2024 at 6:37 PM     Finalized by (CST): Wilmar Cortez MD on 9/03/2024 at 6:43 PM      AST   Date/Time Value Ref Range Status   09/05/2024 05:50  (H) <34 U/L Final     ALT   Date/Time Value Ref Range Status   09/05/2024 05:50  (H) 10 - 49 U/L Final     BUN   Date/Time Value Ref Range Status   09/05/2024 05:50 AM 8 (L) 9 - 23 mg/dL Final              Assessment    78 yr-old female with hx of dyslipidemia, COPD (no chronic oxygen requirement), anxiety, and dysphagia s/p EGD revealing mid-distal esophageal stricture (15 mm) s/p balloon dilation to 18 mm with bx revealing lymphocytic esophagitis (7/2024; Dr Mai) admitted with persistent RUQ pain which started 8/31 and progressed to non-bloody diarrhea and night sweats, with CT showing gallbladder  distension/inflammatory changes, and MRI MRCP showing CB, PD, and IH ductal dilation. Ddx includes choledocholithiasis, ampullary stricture, ampullary/pancreatic mass.       Plan   -NPO  -EUS with possible ERCP with Dr. Mai today, discussed case with him; EUS and possible ERCP with the assistance of MAC anesthesia for further evaluation-the risks, benefits and alternatives were discussed, including the risk of the pancreatitis, anesthesia and the risk of perforation and bleeding with the procedure itself.  The risks of not proceeding were also discussed, including the risks of missing lesions including polyps or masses. The patient expresses an understanding and agrees to proceed as planned  -appreciate surgery reccs  -IV antibiotics per primary team  -continue to trend CMP       Deven Forbes MD  10:18 AM  9/5/2024  [facility]  [phone]

## 2024-09-05 NOTE — ANESTHESIA PREPROCEDURE EVALUATION
PRE-OP EVALUATION    Patient Name: Su Christensen Ace    Admit Diagnosis: Acute cholecystitis [K81.0]    Pre-op Diagnosis: bile duct dilation    ENDOSCOPIC ULTRASOUND (EUS), ENDOSCOPIC RETROGRADE CHOLANGIOPANCREATOGRAPHY (ERCP)    Anesthesia Procedure: ENDOSCOPIC ULTRASOUND (EUS), ENDOSCOPIC RETROGRADE CHOLANGIOPANCREATOGRAPHY (ERCP)  ENDOSCOPIC RETROGRADE CHOLANGIOPANCREATOGRAPHY (ERCP)    Surgeons and Role:     * Elijah Mai MD - Primary    Pre-op vitals reviewed.  Temp: 98.4 °F (36.9 °C)  Pulse: 75  Resp: 16  BP: 117/62  SpO2: 93 %  Body mass index is 25.79 kg/m².    Current medications reviewed.  Hospital Medications:   [COMPLETED] magnesium sulfate in sterile water for injection 2 g/50mL IVPB premix 2 g  2 g Intravenous Once    levoFLOXacin in dextrose 5% (Levaquin) 750 mg/150mL IVPB premix 750 mg  750 mg Intravenous Q24H    sodium chloride 0.9% infusion  125 mL/hr Intravenous Continuous    sodium chloride 0.9% infusion   Intravenous Continuous    acetaminophen (Tylenol Extra Strength) tab 500 mg  500 mg Oral Q4H PRN    HYDROmorphone (Dilaudid) 1 MG/ML injection 0.2 mg  0.2 mg Intravenous Q2H PRN    Or    HYDROmorphone (Dilaudid) 1 MG/ML injection 0.4 mg  0.4 mg Intravenous Q2H PRN    Or    HYDROmorphone (Dilaudid) 1 MG/ML injection 0.8 mg  0.8 mg Intravenous Q2H PRN    HYDROcodone-acetaminophen (Norco) 5-325 MG per tab 1 tablet  1 tablet Oral Q4H PRN    Or    HYDROcodone-acetaminophen (Norco) 5-325 MG per tab 2 tablet  2 tablet Oral Q4H PRN    melatonin tab 3 mg  3 mg Oral Nightly PRN    polyethylene glycol (PEG 3350) (Miralax) 17 g oral packet 17 g  17 g Oral Daily PRN    sennosides (Senokot) tab 17.2 mg  17.2 mg Oral Nightly PRN    bisacodyl (Dulcolax) 10 MG rectal suppository 10 mg  10 mg Rectal Daily PRN    fleet enema (Fleet) rectal enema 133 mL  1 enema Rectal Once PRN    ondansetron (Zofran) 4 MG/2ML injection 4 mg  4 mg Intravenous Q6H PRN    metoclopramide (Reglan) 5 mg/mL injection 5 mg  5 mg  Intravenous Q8H PRN    sodium chloride (Saline Mist) 0.65 % nasal solution 1 spray  1 spray Each Nare Q3H PRN    glycerin-hypromellose- (Artificial Tears) 0.2-0.2-1 % ophthalmic solution 1 drop  1 drop Both Eyes QID PRN    sertraline (Zoloft) tab 25 mg  25 mg Oral Daily    rosuvastatin (Crestor) tab 20 mg  20 mg Oral Daily    [Held by provider] enoxaparin (Lovenox) 40 MG/0.4ML SUBQ injection 40 mg  40 mg Subcutaneous Daily    [COMPLETED] gadoterate meglumine (Dotarem) 7.5 MMOL/15ML injection 15 mL  15 mL Intravenous ONCE PRN       Outpatient Medications:     Medications Prior to Admission   Medication Sig Dispense Refill Last Dose    pantoprazole 40 MG Oral Tab EC Take one tablet (40 mg total) by mouth once daily, 30 minutes prior to breakfast. 90 tablet 3 Past Week    rosuvastatin 20 MG Oral Tab Take 1 tablet (20 mg total) by mouth every morning.   9/4/2024    sertraline 50 MG Oral Tab Take 0.5 tablets (25 mg total) by mouth daily.   9/4/2024    VENTOLIN  (90 BASE) MCG/ACT Inhalation Aero Soln INHALE 2 PUFFS Q 4 H PRN  0 Past Month       Allergies: Augmentin [amoxicillin-pot clavulanate], Penicillins, and Keflex [cephalexin]      Anesthesia Evaluation    Patient summary reviewed.    Anesthetic Complications  (-) history of anesthetic complications         GI/Hepatic/Renal                                 Cardiovascular                                                       Endo/Other                                  Pulmonary        (+) COPD       (+) shortness of breath            Neuro/Psych                              Patient Active Problem List:     Ganglion of wrist, right     Pseudophakia of both eyes     Posterior vitreous detachment of both eyes     CARRIZALES (dyspnea on exertion)     Abscess of parotid gland     Acute parotitis     EBV (Felicia-Barr virus) viremia     Hypocomplementemia (HCC)     Hypogammaglobulinemia (HCC)     Esophageal dysphagia     Problems with swallowing and mastication      Acute cholecystitis     Calculus of gallbladder with acute cholecystitis and obstruction     Common bile duct (CBD) stricture (HCC)     Common bile duct dilation     Dilation of pancreatic duct (HCC)     Intrahepatic bile duct dilation     Generalized abdominal pain     Acute cystitis without hematuria            Past Surgical History:   Procedure Laterality Date    Biopsy/removal, lymph node(s)      David biopsy stereo nodule 1 site right (cpt=19081)          Tonsillectomy       Social History     Socioeconomic History    Marital status:    Tobacco Use    Smoking status: Former     Current packs/day: 0.00     Average packs/day: 1 pack/day for 35.0 years (35.0 ttl pk-yrs)     Types: Cigarettes     Quit date: 9/15/2018     Years since quittin.9    Smokeless tobacco: Never    Tobacco comments:     Off and on   Substance and Sexual Activity    Alcohol use: Yes     Alcohol/week: 14.0 standard drinks of alcohol     Types: 14 Standard drinks or equivalent per week    Drug use: No     History   Drug Use No     Available pre-op labs reviewed.  Lab Results   Component Value Date    WBC 5.7 2024    RBC 3.89 2024    HGB 13.1 2024    HCT 38.5 2024    MCV 99.0 2024    MCH 33.7 2024    MCHC 34.0 2024    RDW 12.0 2024    .0 (L) 2024     Lab Results   Component Value Date     2024    K 4.3 2024     2024    CO2 25.0 2024    BUN 8 (L) 2024    CREATSERUM 0.66 2024    GLU 94 2024    CA 9.0 2024     Lab Results   Component Value Date    INR 1.12 2024         Airway      Mallampati: II  Mouth opening: 3 FB  TM distance: 4 - 6 cm  Neck ROM: full Cardiovascular      Rhythm: regular  Rate: normal     Dental      Dental appliance(s): lower dentures       Pulmonary      Breath sounds clear to auscultation bilaterally.               Other findings              ASA: 2   Plan: MAC  NPO status verified  and patient meets guidelines.    Post-procedure pain management plan discussed with surgeon and patient.    Comment: Plan is MAC anesthesia, which may include deep sedation.  Implied that memory of procedure is unlikely although intraop recall, if it occurs, may be a reasonable and comfortable experience with this anesthetic.  Aware that general anesthesia is not intended though deep sedation may include occasional moments of general anesthesia.  The backup plan for safe patient care may include change of plan to full general anesthesia with potential airway placement.  Patient (legal guardian) demonstrated understanding, accepts risks and benefits, and wishes to proceed as reflected in the signed consent form.  Difficulty airway equipment available as needed, may need general anesthesia OETT.  Previous anesthesia records reviewed.      Plan/risks discussed with: patient                Present on Admission:  **None**

## 2024-09-05 NOTE — PLAN OF CARE
Pt A&Ox4  Ra;VSS  C.o headache- norco given   EUS today   Mg replaced  Pt left unit in stable condition       Problem: GASTROINTESTINAL - ADULT  Goal: Minimal or absence of nausea and vomiting  Description: INTERVENTIONS:  - Maintain adequate hydration with IV or PO as ordered and tolerated  - Nasogastric tube to low intermittent suction as ordered  - Evaluate effectiveness of ordered antiemetic medications  - Provide nonpharmacologic comfort measures as appropriate  - Advance diet as tolerated, if ordered  - Obtain nutritional consult as needed  - Evaluate fluid balance  Outcome: Progressing  Goal: Maintains or returns to baseline bowel function  Description: INTERVENTIONS:  - Assess bowel function  - Maintain adequate hydration with IV or PO as ordered and tolerated  - Evaluate effectiveness of GI medications  - Encourage mobilization and activity  - Obtain nutritional consult as needed  - Establish a toileting routine/schedule  - Consider collaborating with pharmacy to review patient's medication profile  Outcome: Progressing     Problem: PAIN - ADULT  Goal: Verbalizes/displays adequate comfort level or patient's stated pain goal  Description: INTERVENTIONS:  - Encourage pt to monitor pain and request assistance  - Assess pain using appropriate pain scale  - Administer analgesics based on type and severity of pain and evaluate response  - Implement non-pharmacological measures as appropriate and evaluate response  - Consider cultural and social influences on pain and pain management  - Manage/alleviate anxiety  - Utilize distraction and/or relaxation techniques  - Monitor for opioid side effects  - Notify MD/LIP if interventions unsuccessful or patient reports new pain  - Anticipate increased pain with activity and pre-medicate as appropriate  Outcome: Progressing     Problem: Patient/Family Goals  Goal: Patient/Family Long Term Goal  Description: Patient's Long Term Goal:go  home    Interventions:  -consult  -pain controlled  -tolerate diet  -follow up care  - See additional Care Plan goals for specific interventions  Outcome: Progressing  Goal: Patient/Family Short Term Goal  Description: Patient's Short Term Goal: be comfortable    Interventions:   - pain medicine as needed  - See additional Care Plan goals for specific interventions  Outcome: Progressing

## 2024-09-06 VITALS
HEIGHT: 65 IN | OXYGEN SATURATION: 91 % | BODY MASS INDEX: 25.83 KG/M2 | TEMPERATURE: 99 F | RESPIRATION RATE: 18 BRPM | SYSTOLIC BLOOD PRESSURE: 110 MMHG | WEIGHT: 155 LBS | HEART RATE: 92 BPM | DIASTOLIC BLOOD PRESSURE: 50 MMHG

## 2024-09-06 PROBLEM — C24.0: Status: ACTIVE | Noted: 2024-09-06

## 2024-09-06 LAB
ALBUMIN SERPL-MCNC: 3.5 G/DL (ref 3.2–4.8)
ALBUMIN/GLOB SERPL: 1.9 {RATIO} (ref 1–2)
ALP LIVER SERPL-CCNC: 118 U/L
ALT SERPL-CCNC: 71 U/L
ANION GAP SERPL CALC-SCNC: 8 MMOL/L (ref 0–18)
AST SERPL-CCNC: 51 U/L (ref ?–34)
BASOPHILS # BLD AUTO: 0.03 X10(3) UL (ref 0–0.2)
BASOPHILS NFR BLD AUTO: 0.4 %
BILIRUB SERPL-MCNC: 1 MG/DL (ref 0.2–1.1)
BUN BLD-MCNC: <5 MG/DL (ref 9–23)
CALCIUM BLD-MCNC: 8.4 MG/DL (ref 8.7–10.4)
CHLORIDE SERPL-SCNC: 108 MMOL/L (ref 98–112)
CO2 SERPL-SCNC: 22 MMOL/L (ref 21–32)
CREAT BLD-MCNC: 0.65 MG/DL
EGFRCR SERPLBLD CKD-EPI 2021: 91 ML/MIN/1.73M2 (ref 60–?)
EOSINOPHIL # BLD AUTO: 0.02 X10(3) UL (ref 0–0.7)
EOSINOPHIL NFR BLD AUTO: 0.3 %
ERYTHROCYTE [DISTWIDTH] IN BLOOD BY AUTOMATED COUNT: 11.9 %
GLOBULIN PLAS-MCNC: 1.8 G/DL (ref 2–3.5)
GLUCOSE BLD-MCNC: 105 MG/DL (ref 70–99)
HCT VFR BLD AUTO: 37.3 %
HGB BLD-MCNC: 13 G/DL
IMM GRANULOCYTES # BLD AUTO: 0.03 X10(3) UL (ref 0–1)
IMM GRANULOCYTES NFR BLD: 0.4 %
LYMPHOCYTES # BLD AUTO: 0.78 X10(3) UL (ref 1–4)
LYMPHOCYTES NFR BLD AUTO: 10.8 %
MAGNESIUM SERPL-MCNC: 1.6 MG/DL (ref 1.6–2.6)
MCH RBC QN AUTO: 33.8 PG (ref 26–34)
MCHC RBC AUTO-ENTMCNC: 34.9 G/DL (ref 31–37)
MCV RBC AUTO: 96.9 FL
MONOCYTES # BLD AUTO: 0.63 X10(3) UL (ref 0.1–1)
MONOCYTES NFR BLD AUTO: 8.7 %
NEUTROPHILS # BLD AUTO: 5.72 X10 (3) UL (ref 1.5–7.7)
NEUTROPHILS # BLD AUTO: 5.72 X10(3) UL (ref 1.5–7.7)
NEUTROPHILS NFR BLD AUTO: 79.4 %
PLATELET # BLD AUTO: 107 10(3)UL (ref 150–450)
POTASSIUM SERPL-SCNC: 4 MMOL/L (ref 3.5–5.1)
PROT SERPL-MCNC: 5.3 G/DL (ref 5.7–8.2)
RBC # BLD AUTO: 3.85 X10(6)UL
SODIUM SERPL-SCNC: 138 MMOL/L (ref 136–145)
WBC # BLD AUTO: 7.2 X10(3) UL (ref 4–11)

## 2024-09-06 PROCEDURE — 3078F DIAST BP <80 MM HG: CPT | Performed by: STUDENT IN AN ORGANIZED HEALTH CARE EDUCATION/TRAINING PROGRAM

## 2024-09-06 PROCEDURE — 99239 HOSP IP/OBS DSCHRG MGMT >30: CPT | Performed by: STUDENT IN AN ORGANIZED HEALTH CARE EDUCATION/TRAINING PROGRAM

## 2024-09-06 PROCEDURE — 3008F BODY MASS INDEX DOCD: CPT | Performed by: STUDENT IN AN ORGANIZED HEALTH CARE EDUCATION/TRAINING PROGRAM

## 2024-09-06 PROCEDURE — 1159F MED LIST DOCD IN RCRD: CPT | Performed by: STUDENT IN AN ORGANIZED HEALTH CARE EDUCATION/TRAINING PROGRAM

## 2024-09-06 PROCEDURE — 99231 SBSQ HOSP IP/OBS SF/LOW 25: CPT

## 2024-09-06 PROCEDURE — 3074F SYST BP LT 130 MM HG: CPT | Performed by: STUDENT IN AN ORGANIZED HEALTH CARE EDUCATION/TRAINING PROGRAM

## 2024-09-06 NOTE — PROGRESS NOTES
[facility]   part of [facility]     Hospitalist Progress Note     Su Christensen Ace Patient Status:  Observation    1945 MRN [id number]   Location [facility] 0SW-A Attending Joslyn Belcher, DO   Hosp Day # 1 PCP Liliana Ybarra MD     Chief Complaint: Abdominal pain    Subjective:     Patient resting in bed and states she feels the same.  She denies any nausea, vomiting.  She wishes to eat    Objective:    Review of Systems:   A comprehensive review of systems was completed; pertinent positive and negatives stated in subjective.    Vital signs:  Temp:  [97.9 °F (36.6 °C)-100.3 °F (37.9 °C)] 98.9 °F (37.2 °C)  Pulse:  [81-95] 92  Resp:  [16-18] 18  BP: ()/(50-68) 110/50  SpO2:  [91 %-100 %] 91 %    Physical Exam:    General: No acute distress  Respiratory: No wheezes, no rhonchi  Cardiovascular: S1, S2, regular rate and rhythm  Abdomen: Soft, Non-tender, non-distended, positive bowel sounds  Neuro: No new focal deficits.   Extremities: No edema    Diagnostic Data:    Labs:  Recent Labs   Lab 24  1641 24  1332 24  1419 24  0550 24  0728   WBC 8.3 7.7  --  5.7 7.2   HGB 16.0 15.5  --  13.1 13.0   MCV 95.4 94.4  --  99.0 96.9   .0* 158.0  --  131.0* 107.0*   INR  --   --  1.12  --   --        Recent Labs   Lab 24  1332 24  0550 24  0728   GLU 93 94 105*   BUN 8* 8* <5*   CREATSERUM 0.72 0.66 0.65   CA 10.1 9.0 8.4*   ALB 5.0* 4.1 3.5    141 138   K 4.3 4.3 4.0    110 108   CO2 16.0* 25.0 22.0   ALKPHO 79 173* 118   AST 26 217* 51*   ALT 13 158* 71*   BILT 1.1 1.2* 1.0   TP 7.4 5.9 5.3*       Estimated Creatinine Clearance: 66.3 mL/min (based on SCr of 0.65 mg/dL).    No results for input(s): \"TROP\", \"TROPHS\", \"CK\" in the last 168 hours.    Recent Labs   Lab 24  1419   PTP 14.4   INR 1.12                  Microbiology    No results found for this visit on 24.      Imaging: Reviewed in Epic.    Medications:     sertraline  25 mg Oral Daily    rosuvastatin  20 mg Oral Daily    [Held by provider] enoxaparin  40 mg Subcutaneous Daily       Assessment & Plan:      #Distal common bile duct tumor  -CT abdomen pelvis reviewed  -MRCP reviewed  -ERCP reviewed  -GI panel in lab  -C. difficile in lab  -Plan for ERCP today; awaiting pathology from tumor  -Soft diet  -GI following  -Surgery following  -Surgical oncology following    #Incidental indeterminate liver lesion  -Patient will need nonemergent dedicated MRI    #Chronic intermittent dysphagia  #Esophageal stricture s/p dilation 07/2024  -GI following    #COPD    #Hyperlipidemia  -Statin    #Depression  -Sertraline      Joslyn Belcher,     Supplementary Documentation:     Quality:  DVT Mechanical Prophylaxis:   SCDs, Early ambuation  DVT Pharmacologic Prophylaxis   Medication    [Held by provider] enoxaparin (Lovenox) 40 MG/0.4ML SUBQ injection 40 mg                Code Status: Full Code  Adam: No urinary catheter in place  Adam Duration (in days):   Central line:    MITA: 9/6/2024    Discharge is dependent on: Clinical improvement  At this point Ms. Murillo is expected to be discharge to: Home    The 21st Century Cures Act makes medical notes like these available to patients in the interest of transparency. Please be advised this is a medical document. Medical documents are intended to carry relevant information, facts as evident, and the clinical opinion of the practitioner. The medical note is intended as peer to peer communication and may appear blunt or direct. It is written in medical language and may contain abbreviations or verbiage that are unfamiliar.              **Certification      PHYSICIAN Certification of Need for Inpatient Hospitalization - Initial Certification    Patient will require inpatient services that will reasonably be expected to span two midnight's based on the clinical documentation in H+P.   Based on patients current state of illness, I anticipate that,  after discharge, patient will require TBD.

## 2024-09-06 NOTE — PROGRESS NOTES
[facility]  Progress Note    Su Christensen Ace Patient Status:  Inpatient    1945 MRN [id number]   Location [facility] 0SW-A Attending Joslyn Belcher, DO   Hosp Day # 1 PCP Liliana Ybarra MD     Subjective:  The patient was seen and examined at bedside.  She denies abdominal pain.  She states she had some nausea yesterday evening with pain medication, but this is since resolved.  She is passing flatus, but has not had a bowel movement.    EUS with Dr. Mai yesterday revealed a distal CBD 9 mm hypoechoic lesion.    Objective/Physical Exam:  /50 (BP Location: Right arm)   Pulse 92   Temp 98.9 °F (37.2 °C) (Oral)   Resp 18   Ht 65\"   Wt 155 lb (70.3 kg)   SpO2 91%   BMI 25.79 kg/m²     Intake/Output Summary (Last 24 hours) at 2024 0853  Last data filed at 2024 0600  Gross per 24 hour   Intake 2100 ml   Output --   Net 2100 ml         General: Alert, oriented x3. No acute distress.  HEENT: Normocephalic, atraumatic. No scleral icterus.  Pulmonary: No respiratory distress, effort normal.   Abdomen: Non-distended, without tympany to percussion. Soft, non-tender to palpation. No rebound or guarding. No peritoneal signs.   Extremities: No lower extremity edema. No clubbing or cyanosis.   Skin: Warm, dry. No jaundice.       Labs:  Lab Results   Component Value Date    WBC 7.2 2024    HGB 13.0 2024    HCT 37.3 2024    .0 2024      Lab Results   Component Value Date    INR 1.12 2024    INR 0.97 2019     Lab Results   Component Value Date     2024    K 4.0 2024     2024    CO2 22.0 2024    BUN <5 2024    CREATSERUM 0.65 2024     2024    CA 8.4 2024    ALKPHO 118 2024    ALT 71 2024    AST 51 2024    BILT 1.0 2024    ALB 3.5 2024    TP 5.3 2024          Assessment:  Patient Active Problem List   Diagnosis    Ganglion of wrist, right     Pseudophakia of both eyes    Posterior vitreous detachment of both eyes    CARRIZALES (dyspnea on exertion)    Abscess of parotid gland    Acute parotitis    EBV (Felicia-Barr virus) viremia    Hypocomplementemia (HCC)    Hypogammaglobulinemia (HCC)    Esophageal dysphagia    Problems with swallowing and mastication    Acute cholecystitis    Calculus of gallbladder with acute cholecystitis and obstruction    Common bile duct (CBD) stricture (HCC)    Common bile duct dilation    Dilation of pancreatic duct (HCC)    Intrahepatic bile duct dilation    Generalized abdominal pain    Acute cystitis without hematuria    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 Greater than half of the visit was spent in counseling the patient on the above listed diagnoses and treatment options.     Nichol Ventura PA-C  9/6/2024  8:53 AM

## 2024-09-06 NOTE — PAYOR COMM NOTE
OBS TO INPT:    --------------  ADMISSION REVIEW     Payor: [employer]  Subscriber #:  [id number]  Authorization Number: [id number]    Admit date: 9/5/24  Admit time:  4:25 PM       REVIEW DOCUMENTATION:     ED Provider Notes          Patient Seen in: [facility] Emergency Department      History     Chief Complaint   Patient presents with    Abdomen/Flank Pain     Stated Complaint: + abd pain Denies NVD. Had CT abd yesterday sent for eval for cholescystitis    Subjective:   HPI    Patient is a 78-year-old female who states for the past 3 days she has had right upper quadrant abdominal pain.  Patient states she had nausea but no vomiting.  Patient states she did not have a fever but was sweating at night.  Patient states pain is 5-6 out of 10.  Patient states is dull ache, no radiation.  No chest pain, shortness of breath.  No hematuria or dysuria, no diarrhea.  Patient saw her doctor yesterday had outpatient blood work and outpatient CT showing cholecystitis.  Her doctor recommended she come last night but patient waited and came in today.  Patient states she is still having pain.  Patient has not had anything to eat since Monday.  Remainder of review of systems negative.      Physical Exam     ED Triage Vitals [09/04/24 1221]   /88   Pulse 95   Resp 16   Temp 96.9 °F (36.1 °C)   Temp src Temporal   SpO2 95 %   O2 Device None (Room air)       Current Vitals:   Vital Signs  BP: 133/77  Pulse: 70  Resp: 16  Temp: 96.9 °F (36.1 °C)  Temp src: Temporal  MAP (mmHg): 95    Oxygen Therapy  SpO2: 100 %  O2 Device: None (Room air)            Physical Exam  GENERAL: Patient resting on the cart in no acute distress.  HEENT: Extraocular muscles intact, pupils equal round reactive to light.  Mouth normal, neck supple, no meningismus.  LUNGS: Lungs clear to auscultation bilaterally.  CARDIOVASCULAR: + S1-S2, regular rate and rhythm, no murmurs.  BACK: No CVA tenderness, no midline bony tenderness.  ABDOMEN: + Bowel  sounds, soft, moderate tenderness right upper quadrant, nondistended.  No rebound, no guarding, no hepatosplenomegaly.  EXTREMITIES: Full range of motion, no tenderness, good capillary refill.  SKIN: No rash, good turgor.  NEURO: Patient answers questions appropriately.  No focal deficits appreciated.           ED Course     Labs Reviewed   COMP METABOLIC PANEL (14) - Abnormal; Notable for the following components:       Result Value    CO2 16.0 (*)     BUN 8 (*)     Albumin 5.0 (*)     A/G Ratio 2.1 (*)     All other components within normal limits   LIPASE - Normal     Outpatient CT from yesterday  1. Mild-to-moderate gallbladder distension with gallbladder wall thickening and mild pericholecystic inflammatory changes are concerning for changes of acute cholecystitis in the appropriate clinical setting.  Clinical correlation recommended.  Dedicated    abdominal ultrasound and/or HIDA scan may be helpful for further evaluation as clinically directed.       2. There is also dilatation of the common bile duct measuring up to 10 mm in diameter with a distal obstructing stone, stricture, or mass not excluded.  Clinical correlation recommended.  MRCP/ERCP may be helpful for further evaluation as clinically   directed.      3. Uncomplicated colonic diverticulosis.      4. Indeterminate enhancing lesion in the dome the liver measuring 10 x 10 mm could be further characterized with a nonemergent dedicated MRI of the abdomen with and without Eovist contrast.             MDM      Patient is NPO.  Patient was given pain medicines.  I did speak with general surgery recommended admission and they will evaluate the patient.  I did speak with the [facility].  I did consider acute cholecystitis, choledocholithiasis, pancreatitis.  Surgery did not recommend antibiotics at this time.  Admission disposition: 9/4/2024  3:41 PM       Disposition and Plan     Clinical Impression:  1. 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      Patient was at her baseline health until 5 days prior to admission when she developed a right-sided abdominal pain.  She describes it as \"muscle cramp\".  It was tolerable at rest but exacerbated with specific movement or coughing.  She was evaluated by her PCP yesterday as part of her annual physical when she reported this pain.  Denies associated nausea, fevers, chills.  She does report night sweats for the past 2 nights.  Also describes 3 episodes daily of nonbloody diarrhea x 3 days.  Denies urinary frequency, dysuria, urgency. Patient had an outpatient CT done today which showed mild to moderate gallbladder distention and gallbladder wall thickening and mild pericholecystic inflammatory changes concerning for acute cholecystitis.  There is also dilatation of the common bile duct measuring 10 mm in diameter with a distal obstructing stone/stricture/mass.     Patient was afebrile, nontachycardic and normotensive on arrival to the ER.  CMP with mild metabolic acidosis with bicarb of 16.  AST, ALT, T bili within normal limits.  CBC is within normal limits.  Patient was given IV fluids, IV Dilaudid.  Surgery was consulted.             Assessment & Plan:  #Abdominal pain possibly secondary to cholecystitis/choledocholithiasis  -GI and surgery consults pending  -Keep NPO  -Supportive care  -Trend LFTs     #Incidental indeterminate liver  lesion-patient will need a nonemergent dedicated MRI     #Chronic intermittent dysphagia  #Esophageal stricture s/p dilation 07/2024  -GI following     #COPD-clear breath sounds currently     #Depression-continue sertraline  #Hyperlipidemia-rosuvastatin     Plan of care discussed with patient     Maeve Ponce MD            9/4:  SURGERY:    Requesting Physician:  Dr. Collins     Reason for Consultation:  Acute cholecystitis      Chief Complaint:  Abdominal pain     History of Present Illness:  Su Christensen Ace is a 78 year old female who presents to [facility] with worsening abdominal pain.     On Saturday, the patient reports feeling off. She did have a bowl of chili for dinner. On Sunday, she started to experience right upper quadrant abdominal pain. Since then, she reports decreased appetite and is unsure if her pain is associated with food. She denies nausea or vomiting. She denies fevers or chills. She reports non-bloody diarrhea x 3 days.      She recently underwent an endoscopy due to a choking episode. During the endoscopy, she was found to have an esophageal stricture and underwent dilation in July 2024.     Upon presentation to the hospital, the patient was hemodynamically stable. CBC reveals WBC 7.7 with no left shift, hemoglobin 15.1, platelets 158,000. No electrolyte abnormalities. No acute kidney injury. AST 26, ALT 13, alkaline phosphatase 79. Total bilirubin 1.1. Lipase 40.      CT of the abdomen and pelvis reveals mild to moderate gallbladder distension with gallbladder wall thickening and mild pericholecystic inflammatory changes which are concerning for acute cholecystitis. There is dilation of the common bile duct measuring up to 10 mm in diameter with a distal obstructing stone, stricture or mass can not be excluded. MRI is ordered and pending.      The patient denies significant past surgical history. She has a significant past medical history of chronic intermittent dysphagia, lymphocytic  esophagitis on biopsy, COPD and depression. She denies taking blood thinning medication.  78-year-old female who presents to the emergency department with complaints of abdominal pain.  The patient reports that on Saturday evening she ate a bowl of chili for dinner and subsequently on Sunday began to experience abdominal bloating and distention and right upper quadrant abdominal pain.  Workup in the emergency department revealed normal CBC, CMP with bicarb of 16,  CT scan of the abdomen and pelvis revealed a 1 cm indeterminant enhancing lesion in the liver for which MRI is recommended.  Additional 6 mm lesion which is too small to characterize is also seen.  Mild to moderate gallbladder distention with gallbladder wall thickening and pericholecystic fluid and inflammatory changes consistent with acute cholecystitis is noted.  No CT evidence of cholelithiasis.  Dilatation of the common bile duct measuring up to 10 mm in diameter is seen with a distal obstructing stone, stricture or mass which cannot be differentiated.  MRCP is recommended.  Subcentimeter hypodensities in the spleen and the kidney are seen.  Plaque noted in the aorta and iliac vessels.  Diverticulosis.  On examination, abdomen is soft, nondistended, tender to deep palpation in the right upper quadrant.  Very mildly tender to palpation in the epigastrium.  There is no evidence of guarding, rebound or percussion tenderness.     CT scan findings were reviewed in detail with Su.  At this time the recommendation is to proceed with MRCP.  MRCP is ordered for today.  If MRCP is negative for abnormality, would consider proceeding with laparoscopic cholecystectomy with intraoperative cholangiogram for acute cholecystitis definitive plan will be pending MRCP tomorrow.  Su is comfortable with this treatment plan.  She has no further questions at this time and wishes to proceed as discussed.       Past medical history-ganglion cyst, Felicia-Barr, esophageal  dysphagia for which the patient underwent recent dilatation in July 2024, benign breast biopsy  The patient is not anticoagulated     JUANITO Mcguire MD, FACS     9/5 GI:      PREOPERATIVE DIAGNOSIS/INDICATION: Abnormal LFT's, abnormal imaging of biliary tree CT/MRI  POSTOPERTATIVE DIAGNOSIS: Distal bile duct lesion r/o malignancy, sludge  PROCEDURE PERFORMED: ERCP with biliary sphincterotomy, balloon sweep and biopsy  TIME OUT WAS PERFORMED     SEDATION: MAC sedation provided by General Anesthesia     INFORMED CONSENT: Risks, benefits and alternatives to the procedure were explained to the patient including but not limited to bleeding, infection, perforation, adverse drug reactions, pancreatitis and the need for hospitalization and surgery if this occurs, the patient understands and agrees to procedure.  PROCEDURE DESCRIPTION: The therapeutic side viewing duodenoscope was introduced into the patient’s mouth, hypo pharynx, esophagus, stomach and the first and second portion of the duodenum, straightening of the endoscope was performed to obtain a direct view of the major ampulla. Careful but limited examination of the above described areas was performed on withdrawal of the endoscope. The patient tolerated the procedure well and there were no immediate complications noted during the procedure, the patient was transported to the recovery area in stable condition.  FINDINGS:  DUODENUM: Normal  MAJOR AMPULLA:Normal  ERP: Not attempted  ERC: The CBD showed dilatation and sludge, the distal CBD showed a fleshy friable lesion prolapsing through the ampulla with balloon sweep, the CHD, the confluence of the right and left hepatic ducts and the intrahepatics were mildly dilated, the cystic duct and the gallbladder were not visualized  THERAPEUTICS: The CBD was cannulated using a standard 0.035 [place] [employer]  Hydratome RX44, 20mm cut wire, deep cannulation was performed with the help of a 0.035 straight Acro[employer] wire 260cm in  length, an adequate biliary sphincterotomy was performed with standard ERBE settings, there were no immediate complication noted. Biliary balloon stone extraction was performed the help of [employer]’s 9-12mm triple lumen stone extraction balloon, sludge was retrieved. Distal bile duct mass biopsies were performed with cold forceps  RECOMMENDATIONS:   Post ERCP precautions, watch for bleeding, infection, perforation, adverse drug reactions and pancreatitis.  CMP, CBC in AM.  Call status report post procedure.  Follow biopsies.     Elijah Mai MD  9/5/2024  3:57 PM           Revision History             PREOPERATIVE DIAGNOSIS/INDICATION: Abnormal LFT's, abnormal imaging of biliary tree CT/MRI   POSTOPERTATIVE DIAGNOSIS: Distal CBD lesion  PROCEDURE PERFORMED: EUS/EGD  TIME OUT WAS PERFORMED     SEDATION: MAC sedation provided by General Anesthesia     INFORMED CONSENT: Risks, benefits and alternatives to the procedure were explained to the patient including but not limited to bleeding, infection, perforation, adverse drug reactions, pancreatitis and the need for hospitalization and surgery if this occurs, the patient understands and agrees to procedure.  PROCEDURE DESCRIPTION: The therapeutic side viewing echoendoscope was introduced into the patient’s mouth, hypo pharynx, esophagus, stomach and the first and second portion of the duodenum, straightening of the endoscope was performed to obtain a direct view of the major ampulla. Careful but limited examination of the above described areas was performed on withdrawal of the endoscope. The patient tolerated the procedure well and there were no immediate complications noted during the procedure, the patient was transported to the recovery area in stable condition.  FINDINGS:  DUODENUM: Normal  MAJOR AMPULLA: Normal  ECHO: Imaging was performed through the esophagus, stomach and duodenum. The CBD showed dilatation and sludge, the distal CBD showed a 9 mm  hypoechoic lesion, the CHD, the confluence of the right and left hepatic ducts and the intrahepatics were mildly dilated   THERAPEUTICS: None  RECOMMENDATIONS:   Post EUS/EGD precautions, watch for bleeding, infection, perforation, adverse drug reactions and pancreatitis.  Call status report post procedure.        Elijah Mai MD  9/5/2024  4:01 PM                 MEDICATIONS ADMINISTERED IN LAST 1 DAY:  acetaminophen (Tylenol Extra Strength) tab 500 mg       Date Action Dose Route User    9/6/2024 0100 Given 500 mg Oral Lucy Brewer RN          HYDROcodone-acetaminophen (Norco) 5-325 MG per tab 1 tablet       Date Action Dose Route User    9/5/2024 2113 Given 1 tablet Oral Tristan Marinelli RN    9/5/2024 1729 Given 1 tablet Oral Ioana Patel RN    9/5/2024 1147 Given 1 tablet Oral Ioana Patel RN          iohexol (OMNIPAQUE) 350 MG/ML injection       Date Action Dose Route User    9/5/2024 1547 Given 20 mL (none) Elijah Mai MD          lactated ringers infusion       Date Action Dose Route User    9/5/2024 1526 New Bag (none) Intravenous Kasi Rubio MD          levoFLOXacin in dextrose 5% (Levaquin) 750 mg/150mL IVPB premix 750 mg       Date Action Dose Route User    9/5/2024 1141 New Bag 750 mg Intravenous Ioana Patel RN          lidocaine PF (Xylocaine-MPF) 1% injection       Date Action Dose Route User    9/5/2024 1530 Given 50 mg Intravenous Kasi Rubio MD          magnesium sulfate in sterile water for injection 2 g/50mL IVPB premix 2 g       Date Action Dose Route User    9/5/2024 0816 New Bag 2 g Intravenous Ioana Patel RN          ondansetron (Zofran) 4 MG/2ML injection 4 mg       Date Action Dose Route User    9/6/2024 0100 Given 4 mg Intravenous Lucy Brewer RN          propofol (Diprivan) 10 MG/ML injection       Date Action Dose Route User    9/5/2024 1530 Given 100 mg Intravenous Kasi Rubio MD          propofol (Diprivan) 10 mg/mL infusion premix       Date Action  Dose Route User    9/5/2024 1534 New Bag 105 mcg/kg/min × 70.3 kg Intravenous Kasi Rubio MD          sodium chloride 0.9% infusion       Date Action Dose Route User    9/5/2024 2113 New Bag (none) Intravenous Tristan Marinelli, RN            Vitals (last day)       Date/Time Temp Pulse Resp BP SpO2 Weight O2 Device O2 Flow Rate (L/min) [facility]    09/06/24 0526 99.1 °F (37.3 °C) 85 16 99/54 92 % -- None (Room air) --     09/06/24 0057 100.3 °F (37.9 °C) -- -- -- -- -- -- --     09/05/24 2002 97.9 °F (36.6 °C) 81 18 119/55 92 % -- None (Room air) -- AT    09/05/24 1630 -- 81 -- 114/63 97 % -- -- -- SARY    09/05/24 1625 -- 81 -- 120/64 100 % -- -- -- SARY    09/05/24 1620 -- 95 -- 116/64 94 % -- -- -- SARY    09/05/24 1615 -- 82 -- 107/65 94 % -- -- -- SARY    09/05/24 1610 -- 84 -- 109/64 95 % -- -- -- SARY    09/05/24 1605 -- 88 -- 116/68 96 % -- -- -- SARY    09/05/24 1600 -- 82 18 109/64 100 % -- None (Room air) -- SARY    09/05/24 1200 98.4 °F (36.9 °C) 75 16 117/62 93 % -- None (Room air) -- DN    09/05/24 0600 98 °F (36.7 °C) 66 16 90/54 98 % -- None (Room air) -- LP

## 2024-09-06 NOTE — PROGRESS NOTES
Inpatient Follow up Note    Su Christensen Ace Patient Status:  Inpatient    1945 MRN [id number]   Location [facility] 0SW-A Attending No att. providers found   Hosp Day # 1 PCP Liliana Ybarra MD     Reason for Consultation   RUQ pain, dilated ducts/PD     Subjective   Denies abdominal pain, nausea, vomiting, fevers, chills. Is hungry.             Objective:     /50 (BP Location: Right arm)   Pulse 92   Temp 98.9 °F (37.2 °C) (Oral)   Resp 18   Ht 5' 5\" (1.651 m)   Wt 155 lb (70.3 kg)   SpO2 91%   BMI 25.79 kg/m²   Gen: AAOx3  CV: RRR with normal S1 / S2  Resp: CTA bilaterally  Abd: (+)BS, soft, non-tender, non-distended; no rebound or guarding  Ext: No edema or cyanosis  Skin: Warm and dry     Labs/Imaging     LABRCNTIP[PGLU:5@  Recent Labs   Lab 24  1419   INR 1.12         Recent Labs   Lab 24  1332 24  0550 24  0728   WBC 8.3 7.7 5.7 7.2   HGB 16.0 15.5 13.1 13.0   .0* 158.0 131.0* 107.0*       Recent Labs   Lab 242 24  0550 24  0728    137 141 138   K 4.7 4.3 4.3 4.0    106 110 108   CO2 28.0 16.0* 25.0 22.0   BUN 10 8* 8* <5*   CREATSERUM 0.79 0.72 0.66 0.65       Recent Labs   Lab 24  1332 24  0550 24  0728   ALT 13 13 158* 71*   AST 18 26 217* 51*     XR ENDO BILE DUCT (CPT=74328)    Result Date: 2024  CONCLUSION:  Partial opacification of intrahepatic biliary ducts is noted.   LOCATION:  [place]   Dictated by (CST): Allan Daigle MD on 2024 at 4:10 PM     Finalized by (CST): Allan Daigle MD on 2024 at 4:10 PM       MRI ABDOMEN AND MRCP W/3D (W+W0)(CPT=74183/00102)    Result Date: 2024  CONCLUSION:   1.  Dilated intrahepatic ducts,  common bile duct, and pancreatic duct without definite evidence of mass lesion or stone.  This finding could represent a benign or malignant stricture within the distal duct versus an  ampullary tumor.  Further evaluation with ERCP is recommended.  2. Liver lesions representing hemangioma at the dome and small cysts within the remainder of the liver.  LOCATION:  [facility]    Dictated by (CST): Nadir Anglin MD on 9/04/2024 at 9:58 PM     Finalized by (CST): Nadir Anglin MD on 9/04/2024 at 10:05 PM       CT ABDOMEN+PELVIS(CONTRAST ONLY)(CPT=74177)    Result Date: 9/3/2024  CONCLUSION:   1. Mild-to-moderate gallbladder distension with gallbladder wall thickening and mild pericholecystic inflammatory changes are concerning for changes of acute cholecystitis in the appropriate clinical setting.  Clinical correlation recommended.  Dedicated  abdominal ultrasound and/or HIDA scan may be helpful for further evaluation as clinically directed.   2. There is also dilatation of the common bile duct measuring up to 10 mm in diameter with a distal obstructing stone, stricture, or mass not excluded.  Clinical correlation recommended.  MRCP/ERCP may be helpful for further evaluation as clinically directed.  3. Uncomplicated colonic diverticulosis.  4. Indeterminate enhancing lesion in the dome the liver measuring 10 x 10 mm could be further characterized with a nonemergent dedicated MRI of the abdomen with and without Eovist contrast.    Please see above for further details.  The radiology support staff is in the process of contacting the referring physician regarding this report.  LOCATION:  [facility]   Dictated by (CST): Wilmar Cortez MD on 9/03/2024 at 6:37 PM     Finalized by (CST): Wilmar Cortez MD on 9/03/2024 at 6:43 PM      AST   Date/Time Value Ref Range Status   09/06/2024 07:28 AM 51 (H) <34 U/L Final     ALT   Date/Time Value Ref Range Status   09/06/2024 07:28 AM 71 (H) 10 - 49 U/L Final     BUN   Date/Time Value Ref Range Status   09/06/2024 07:28 AM <5 (L) 9 - 23 mg/dL Final     9/5 ERCP: FINDINGS:  DUODENUM: Normal  MAJOR AMPULLA:Normal  ERP: Not attempted  ERC: The CBD showed dilatation and  sludge, the distal CBD showed a fleshy friable lesion prolapsing through the ampulla with balloon sweep, the CHD, the confluence of the right and left hepatic ducts and the intrahepatics were mildly dilated, the cystic duct and the gallbladder were not visualized  THERAPEUTICS: The CBD was cannulated using a standard 0.035 [employer] scientific  Hydratome RX44, 20mm cut wire, deep cannulation was performed with the help of a 0.035 straight Acrobat wire 260cm in length, an adequate biliary sphincterotomy was performed with standard ERBE settings, there were no immediate complication noted. Biliary balloon stone extraction was performed the help of Quip’s 9-12mm triple lumen stone extraction balloon, sludge was retrieved. Distal bile duct mass biopsies were performed with cold forceps    9/5 EUS: FINDINGS:  DUODENUM: Normal  MAJOR AMPULLA: Normal  ECHO: Imaging was performed through the esophagus, stomach and duodenum. The CBD showed dilatation and sludge, the distal CBD showed a 9 mm hypoechoic lesion, the CHD, the confluence of the right and left hepatic ducts and the intrahepatics were mildly dilated          Assessment   78 yr-old female with hx of dyslipidemia, COPD (no chronic oxygen requirement), anxiety, and dysphagia s/p EGD revealing mid-distal esophageal stricture (15 mm) s/p balloon dilation to 18 mm with bx revealing lymphocytic esophagitis (7/2024; Dr Mai) admitted with persistent RUQ pain which started 8/31 and with MRI MRCP showing CB, PD, and IH ductal dilation. EUS showed a 9mm hypoechoic distal CBD lesion, and sphincterotomy and balloon sweep were done with removal of sludge, and biopsies were done of the distal bile duct mass (pending).         Plan   -ok for regular diet  -f/u bile duct mass biopsies  -appreciate surgical oncology consult  -ok to dc today from GI perspective if tolerating po   -we will help arrange outpatient GI f/u       Deven Forbes MD  2:02 PM  9/6/2024  [facility]  Gastroenterology  [phone]

## 2024-09-06 NOTE — DISCHARGE SUMMARY
[facility]     Su Christensen Ace Patient Status:  Inpatient    1945 MRN [id number]   Location [facility] 0SW-A Attending No att. 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      Patient was at her baseline health until 5 days prior to admission when she developed a right-sided abdominal pain.  She describes it as \"muscle cramp\".  It was tolerable at rest but exacerbated with specific movement or coughing.  She was evaluated by her PCP yesterday as part of her annual physical when she reported this pain.  Denies associated nausea, fevers, chills.  She does report night sweats for the past 2 nights.  Also describes 3 episodes daily of nonbloody diarrhea x 3 days.  Denies urinary frequency, dysuria, urgency. Patient had an outpatient CT done today which showed mild to moderate gallbladder distention and gallbladder wall thickening and mild pericholecystic inflammatory changes concerning for acute cholecystitis.  There is also dilatation of the common bile duct measuring 10 mm in diameter with a distal obstructing stone/stricture/mass.     Patient was afebrile, nontachycardic and normotensive on arrival to the ER.  CMP with mild metabolic acidosis with bicarb of 16.  AST, ALT, T bili within normal limits.  CBC is within normal limits.  Patient was given IV fluids, IV Dilaudid.  Surgery was consulted.       Brief  Synopsis: CT abdomen pelvis and MRCP showed dilated common bile duct, pancreatic duct, intrahepatic duct.  GI was consulted.  Surgery was consulted.  Patient underwent ERCP and was found to have distal common bile duct tumor.  Surgical oncology was consulted and was awaiting pathology.  Patient tolerated soft diet well.  She is discharged home with outpatient follow-up.    Lace+ Score: 42  59-90 High Risk  29-58 Medium Risk  0-28   Low Risk       TCM Follow-Up Recommendation:  LACE 29-58: Moderate Risk of readmission after discharge from the hospital.  **Certification    Admission date was 9/4/2024.  Inpatient stay was shorter than expected.  Patient's Acute cholecystitis was initially serious enough to expect a more lengthy hospitalization but patient improved faster than expected.                 Procedures during hospitalization:   MRCP  ERCP    Incidental or significant findings and recommendations (brief descriptions):  See brief synopsis above    Lab/Test results pending at Discharge:   None    Consultants:  GI  Surgery  Surgical oncology    Discharge Medication List:     Discharge Medications        CONTINUE taking these medications        Instructions Prescription details   pantoprazole 40 MG Tbec  Commonly known as: Protonix      Take one tablet (40 mg total) by mouth once daily, 30 minutes prior to breakfast.   Quantity: 90 tablet  Refills: 3     rosuvastatin 20 MG Tabs  Commonly known as: Crestor      Take 1 tablet (20 mg total) by mouth every morning.   Refills: 0     sertraline 50 MG Tabs  Commonly known as: Zoloft      Take 0.5 tablets (25 mg total) by mouth daily.   Refills: 0     Ventolin  (90 Base) MCG/ACT Aers  Generic drug: albuterol      INHALE 2 PUFFS Q 4 H PRN   Refills: 0              [place]    Follow-up appointment:   Liliana Ybarra MD  [address] [place] [address] DR BROWNE [address] [place] [id number]  [phone]    Follow up in 1 week(s)      Omar Acharya MD  [address] [facility]  [address]  [place] [address]  [phone]    Follow up in 1 week(s)      Elijah Mai MD  [address]  [place] [address]  [phone]    Follow up in 1 week(s)      Viviana Mcguire MD  [address]  [place] [address]  [phone]    Follow up  As needed    Appointments for Next 30 Days 2024 - 10/6/2024      None            Vital signs:  Temp:  [97.9 °F (36.6 °C)-100.3 °F (37.9 °C)] 98.9 °F (37.2 °C)  Pulse:  [81-95] 92  Resp:  [16-18] 18  BP: ()/(50-68) 110/50  SpO2:  [91 %-100 %] 91 %    Physical Exam:    General: No acute distress   Lungs: clear to auscultation  Cardiovascular: S1, S2  Abdomen: Soft      -----------------------------------------------------------------------------------------------  PATIENT DISCHARGE INSTRUCTIONS: See electronic chart    Joslyn Belcher DO    Total time spent on discharge plannin minutes     The  Cures Act makes medical notes like these available to patients in the interest of transparency. Please be advised this is a medical document. Medical documents are intended to carry relevant information, facts as evident, and the clinical opinion of the practitioner. The medical note is intended as peer to peer communication and may appear blunt or direct. It is written in medical language and may contain abbreviations or verbiage that are unfamiliar.

## 2024-09-06 NOTE — PLAN OF CARE
Patient is alert and oriented, vitals stable, low grade fever, Tylenol given. No BM overnight. IVF infusing. No new complaints. Safety precautions in place, call light within reach, plan of care ongoing.     Problem: GASTROINTESTINAL - ADULT  Goal: Minimal or absence of nausea and vomiting  Description: INTERVENTIONS:  - Maintain adequate hydration with IV or PO as ordered and tolerated  - Nasogastric tube to low intermittent suction as ordered  - Evaluate effectiveness of ordered antiemetic medications  - Provide nonpharmacologic comfort measures as appropriate  - Advance diet as tolerated, if ordered  - Obtain nutritional consult as needed  - Evaluate fluid balance  Outcome: Progressing     Problem: PAIN - ADULT  Goal: Verbalizes/displays adequate comfort level or patient's stated pain goal  Description: INTERVENTIONS:  - Encourage pt to monitor pain and request assistance  - Assess pain using appropriate pain scale  - Administer analgesics based on type and severity of pain and evaluate response  - Implement non-pharmacological measures as appropriate and evaluate response  - Consider cultural and social influences on pain and pain management  - Manage/alleviate anxiety  - Utilize distraction and/or relaxation techniques  - Monitor for opioid side effects  - Notify MD/LIP if interventions unsuccessful or patient reports new pain  - Anticipate increased pain with activity and pre-medicate as appropriate  Outcome: Progressing

## 2024-09-06 NOTE — PLAN OF CARE
NURSING DISCHARGE NOTE    Discharged Home via Wheelchair.  Accompanied by Spouse  Belongings Taken by patient/family.    Patient given AVS and explained discharge instructions. Patient verbalized understanding and had no other questions at this time. PIV removed and patient left with all belongings at time of discharge.

## 2024-09-06 NOTE — CONSULTS
[facility]          Patient Name:  Su Christensen Ace   YOB: 1945   Gender:  Female   Appt Date:  9/6/2024   Provider:  Omar Acharya MD           CHIEF COMPLAINT  CBD/ampullary mass     PROBLEMS  Reviewed   Patient Active Problem List   Diagnosis    Ganglion of wrist, right    Pseudophakia of both eyes    Posterior vitreous detachment of both eyes    CARRIZALES (dyspnea on exertion)    Abscess of parotid gland    Acute parotitis    EBV (Felicia-Barr virus) viremia    Hypocomplementemia (HCC)    Hypogammaglobulinemia (HCC)    Esophageal dysphagia    Problems with swallowing and mastication    Acute cholecystitis    Calculus of gallbladder with acute cholecystitis and obstruction    Common bile duct (CBD) stricture (HCC)    Common bile duct dilation    Dilation of pancreatic duct (HCC)    Intrahepatic bile duct dilation    Generalized abdominal pain    Acute cystitis without hematuria        History of Present Illness:  Patient is a 78-year-old woman who is currently be consulted for surgical oncology opinion pertaining to distal common bile duct/ampullary mass.    Patient presented to the ED on 9/4/2024 with upper abdominal pain.  CT showed dilated bile duct.  On 9/5/2024, patient underwent ERCP with biliary sphincterotomy and balloon sweep with biopsy. \"The CBD showed dilatation and sludge, the distal CBD showed a fleshy friable lesion prolapsing through the ampulla with balloon sweep, the CHD, the confluence of the right and left hepatic ducts and the intrahepatics were mildly dilated, the cystic duct and the gallbladder were not visualized\".  In addition, US was performed for further evaluation of this distal common bile duct lesion showing a 9 mm hypoechoic lesion, the CHD, the confluence of the right and left hepatic ducts and the intrahepatics were mildly dilated.     Vital Signs:  /50 (BP Location: Right arm)   Pulse 92   Temp 98.9 °F (37.2 °C) (Oral)   Resp 18   Ht  1.651 m (5' 5\")   Wt 70.3 kg (155 lb)   SpO2 91%   BMI 25.79 kg/m²      Medications Reviewed:    Current Outpatient Medications:     levoFLOXacin 750 MG Oral Tab, Take 1 tablet (750 mg total) by mouth daily for 6 days., Disp: 6 tablet, Rfl: 0     Allergies Reviewed:  Allergies   Allergen Reactions    Augmentin [Amoxicillin-Pot Clavulanate] ANAPHYLAXIS    Penicillins ANAPHYLAXIS    Keflex [Cephalexin] RASH        History:  Reviewed:  Past Medical History:    Body piercing    Ears    Calculus of kidney    COPD (chronic obstructive pulmonary disease) (HCC)    Decorative tattoo    Depression    Felicia Swain virus infection    High cholesterol    Slight elevation    Painful swallowing    At times    Pneumonia due to organism    Problems with swallowing    Years ago    Renal disorder    Sinus infection      Reviewed:  Past Surgical History:   Procedure Laterality Date    Biopsy/removal, lymph node(s)      David biopsy stereo nodule 1 site right (cpt=19081)      2015    Tonsillectomy        Reviewed Social History:  Social History     Socioeconomic History    Marital status:    Tobacco Use    Smoking status: Former     Current packs/day: 0.00     Average packs/day: 1 pack/day for 35.0 years (35.0 ttl pk-yrs)     Types: Cigarettes     Quit date: 9/15/2018     Years since quittin.9    Smokeless tobacco: Never    Tobacco comments:     Off and on   Substance and Sexual Activity    Alcohol use: Yes     Alcohol/week: 14.0 standard drinks of alcohol     Types: 14 Standard drinks or equivalent per week    Drug use: No     Social Determinants of Health     Food Insecurity: No Food Insecurity (2024)    Food Insecurity     Food Insecurity: Never true   Transportation Needs: No Transportation Needs (2024)    Transportation Needs     Lack of Transportation: No   Housing Stability: Low Risk  (2024)    Housing Stability     Housing Instability: No      Reviewed:  Family History   Problem Relation Age of Onset     Cancer Father     Heart Disorder Father     Other (Other) Mother     Stroke Mother     Diabetes Maternal Grandmother     Other (Other) Maternal Grandmother       Review of Systems:  GENERAL HEALTH: feels well, no fatigue.   RESPIRATORY: denies shortness of breath  CARDIOVASCULAR: denies chest pain  GI: denies nausea, vomiting, constipation, diarrhea; no rectal bleeding.  Poor appetite for about 1 week.  GENITAL/: no blood in urine  MUSCULOSKELETAL: no joint complaints, no back pain  NEURO: no tingling, numbness, weakness  ENDOCRINE: denies weight loss/gain         Physical Examination:  Constitutional: NAD.   Eyes: Sclera: non-icteric.  Lymph Nodes: Lymph Nodes no cervical LAD, supraclavicular LAD, axillary LAD, or inguinal LAD.   Abdomen: Inspection and Palpation: no masses, tenderness (no guarding, no rebound), or CVA tenderness and soft and non-distended. Liver: no hepatomegaly. Spleen: no splenomegaly. Hernia: none palpable.   Musculoskeletal: Extremities: no edema.   Skin: Inspection and palpation: no jaundice.      Document Review:  Lab Results   Component Value Date    WBC 7.2 09/06/2024    HGB 13.0 09/06/2024    HCT 37.3 09/06/2024    .0 09/06/2024    CREATSERUM 0.65 09/06/2024    BUN <5 09/06/2024     09/06/2024    K 4.0 09/06/2024     09/06/2024    CO2 22.0 09/06/2024     09/06/2024    CA 8.4 09/06/2024    ALB 3.5 09/06/2024    ALKPHO 118 09/06/2024    BILT 1.0 09/06/2024    TP 5.3 09/06/2024    AST 51 09/06/2024    ALT 71 09/06/2024    MG 1.6 09/06/2024     CT A/P 9/3/2024:  1. Mild-to-moderate gallbladder distension with gallbladder wall thickening and mild pericholecystic inflammatory changes are concerning for changes of acute cholecystitis in the appropriate clinical setting.    2. There is also dilatation of the common bile duct measuring up to 10 mm in diameter with a distal obstructing stone, stricture, or mass not excluded.          Procedure(s):  None     Assessment  / Plan:  Distal common bile duct tumor  -Findings, differential diagnosis including malignancy were discussed.  -We are awaiting pathology.  -Potential further staging and workup discussed.  -Potential role for surgery discussed.  -Patient agreed and understood.  -She had ample time to ask questions.  -There are no acute surgical issues.  -Okay for advancing diet.            Electronically Signed by:     Omar Acharya MD

## 2024-09-09 NOTE — CDS QUERY
Dear Dr. Belcher:    Please clarify the urinary tract infection    ( ) Urinary tract infection ruled out  ( ) Urinary tract infection confirmed    ( ) Other _________________________________        Documentation from the Medical Record:       h&p: abd pain poss secondary to cholecystitis/choledocholithiasis; incidental indeterminate liver lesion; chronic intermittent dysphagia; esophageal stricture s/p dilation 7/2024; copd; depression; hyperlipidemia;      9/5 pn: acute abd pain; dilated cbd, pancreatic duct; intrahepatic duct; uti; incidental indeterminate liver lesion; chronic intermittent dysphagia; esophageal stricture ;copd; hld; depression;    BILIRUBIN URINE Negative   BLOOD URINE 1+A   CLARITY URINE Clear   COLOR URINE Yellow   EEH BACTERIA URINE (/HPF) RareA   EEH HYALINE CASTS (/LPF) PresentA   EEH RBC URINE (/HPF) 6-10A   EEH URINE RENAL EPITHELIAL CELLS (/HPF) None Seen   EEH URINE SQUAMOUS EPITHELIAL CELL (/HPF) FewA   EEH URINE TRANSITIONAL EPITHELIAL (/HPF) None Seen   EEH WBC URINE (/HPF) 21-50A   EEH YEAST URINE (/HPF) None Seen   GLUCOSE URINE (mg/dL) Normal   KETONES URINE (mg/dL) 20A   LEUKOCYTE ESTERASE URINE 250A   NITRITE URINE Negative   PH URINE 6.0   PROTEIN URINE (mg/dL) 50A   SPEC GRAVITY 1.028   UROBILINOGEN URINE (mg/dL) [place]   URINE CULTURE [id number]        For questions regarding this query, please contact Clinical Documentation : Sarai Chavez RN CDS  Cell#  [phone]    Thank you!

## 2024-09-09 NOTE — PAYOR COMM NOTE
--------------  DISCHARGE REVIEW    Payor: [employer] [facility]  Subscriber #:  [id number]  Authorization Number: [id number]    Admit date: 24  Admit time:   4:25 PM  Discharge Date: 2024 12:44 PM           [facility]     Su Christensen Ace Patient Status:  Inpatient    1945 MRN [id number]   Location [facility] 0SW-A Attending No att. 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      Patient was at her baseline health until 5 days prior to admission when she developed a right-sided abdominal pain.  She describes it as \"muscle cramp\".  It was tolerable at rest but exacerbated with specific movement or coughing.  She was evaluated by her PCP yesterday as part of her annual physical when she reported this pain.  Denies associated nausea, fevers, chills.  She does report night sweats for the past 2 nights.  Also describes 3 episodes daily of nonbloody diarrhea x 3 days.  Denies urinary frequency, dysuria, urgency. Patient had an outpatient CT done today which showed mild to moderate gallbladder distention and gallbladder wall thickening and mild pericholecystic inflammatory changes concerning for acute cholecystitis.  There is also dilatation of the common bile duct measuring 10 mm in diameter with a distal obstructing stone/stricture/mass.     Patient was afebrile, nontachycardic and normotensive on arrival to the ER.   CMP with mild metabolic acidosis with bicarb of 16.  AST, ALT, T bili within normal limits.  CBC is within normal limits.  Patient was given IV fluids, IV Dilaudid.  Surgery was consulted.       Brief Synopsis: CT abdomen pelvis and MRCP showed dilated common bile duct, pancreatic duct, intrahepatic duct.  GI was consulted.  Surgery was consulted.  Patient underwent ERCP and was found to have distal common bile duct tumor.  Surgical oncology was consulted and was awaiting pathology.  Patient tolerated soft diet well.  She is discharged home with outpatient follow-up.    Lace+ Score: 42  59-90 High Risk  29-58 Medium Risk  0-28   Low Risk       TCM Follow-Up Recommendation:  LACE 29-58: Moderate Risk of readmission after discharge from the hospital.  **Certification    Admission date was 9/4/2024.  Inpatient stay was shorter than expected.  Patient's Acute cholecystitis was initially serious enough to expect a more lengthy hospitalization but patient improved faster than expected.                 Procedures during hospitalization:   MRCP  ERCP    Incidental or significant findings and recommendations (brief descriptions):  See brief synopsis above    Lab/Test results pending at Discharge:   None    Consultants:  GI  Surgery  Surgical oncology    Discharge Medication List:     Discharge Medications        CONTINUE taking these medications        Instructions Prescription details   pantoprazole 40 MG Tbec  Commonly known as: Protonix      Take one tablet (40 mg total) by mouth once daily, 30 minutes prior to breakfast.   Quantity: 90 tablet  Refills: 3     rosuvastatin 20 MG Tabs  Commonly known as: Crestor      Take 1 tablet (20 mg total) by mouth every morning.   Refills: 0     sertraline 50 MG Tabs  Commonly known as: Zoloft      Take 0.5 tablets (25 mg total) by mouth daily.   Refills: 0     Ventolin  (90 Base) MCG/ACT Aers  Generic drug: albuterol      INHALE 2 PUFFS Q 4 H PRN   Refills: 0               [facility] reviewed: Yes    Follow-up appointment:   Liliana Ybarra MD  [address] [place]   [address]  [place] [address]  [phone]    Follow up in 1 week(s)      Omar Acharya MD  [address]  [address]  [place] [address]  [phone]    Follow up in 1 week(s)      Elijah Mai MD  [address] [facility]   [place] [address]  [phone]    Follow up in 1 week(s)      Viviana Mcguire MD  [address]  [place] [address]  [phone]    Follow up  As needed    Appointments for Next 30 Days 2024 - 10/6/2024      None            Vital signs:  Temp:  [97.9 °F (36.6 °C)-100.3 °F (37.9 °C)] 98.9 °F (37.2 °C)  Pulse:  [81-95] 92  Resp:  [16-18] 18  BP: ()/(50-68) 110/50  SpO2:  [91 %-100 %] 91 %    Physical Exam:    General: No acute distress   Lungs: clear to auscultation  Cardiovascular: S1, S2  Abdomen: Soft      -----------------------------------------------------------------------------------------------  PATIENT DISCHARGE INSTRUCTIONS: See electronic chart    Joslyn Belcher DO    Total time spent on discharge plannin minutes     The  [employer] Act makes medical notes like these available to patients in the interest of transparency. Please be advised this is a medical document. Medical documents are intended to carry relevant information, facts as evident, and the clinical opinion of the practitioner. The medical note is intended as peer to peer communication and may appear blunt or direct. It is written in medical language and may contain abbreviations or verbiage that are unfamiliar.       Electronically signed by Joslyn Belcher DO on 2024  2:03 PM         REVIEWER COMMENTS

## 2024-09-09 NOTE — PAYOR COMM NOTE
--------------  ADMISSION REVIEW     Payor: [employer] [facility] [place]  Subscriber #:  [id number]  Authorization Number: [id number]    ADMIT TO INPT STATUS 9/5/24  ADMIT TO OBSERVATION 9/4/24 9/4 Patient Seen in: [facility] Emergency Department    History   Stated Complaint: + abd pain Denies NVD. Had CT abd yesterday sent for eval for cholescystitis    Patient is a 78-year-old female who states for the past 3 days she has had right upper quadrant abdominal pain.  Patient states she had nausea but no vomiting.  Patient states she did not have a fever but was sweating at night.  Patient states pain is 5-6 out of 10.  Patient states is dull ache, no radiation.  No chest pain, shortness of breath.  No hematuria or dysuria, no diarrhea.  Patient saw her doctor yesterday had outpatient blood work and outpatient CT showing cholecystitis.  Her doctor recommended she come last night but patient waited and came in today.  Patient states she is still having pain.  Patient has not had anything to eat since Monday.  Remainder of review of systems negative.    Objective:   Past Medical History:    Body piercing    Ears    Calculus of kidney    COPD (chronic obstructive pulmonary disease) (HCC)    Decorative tattoo    Depression    Felicia Swain virus infection    High cholesterol    Slight elevation    Painful swallowing    At times    Pneumonia due to organism    Problems with swallowing    Years ago    Renal disorder    Sinus infection     Past Surgical History:   Procedure Laterality Date    Biopsy/removal, lymph node(s)      David biopsy stereo nodule 1 site right (cpt=19081)      2015    Tonsillectomy  1953     Physical Exam     ED Triage Vitals [09/04/24 1221]   /88   Pulse 95   Resp 16   Temp 96.9 °F (36.1 °C)   Temp src Temporal   SpO2 95 %   O2 Device None (Room air)     Current Vitals:   Vital Signs  BP: 133/77  Pulse: 70  Resp: 16  Temp: 96.9 °F (36.1 °C)  Temp src: Temporal  MAP (mmHg): 95    Physical Exam  GENERAL:  Patient resting on the cart in no acute distress.  HEENT: Extraocular muscles intact, pupils equal round reactive to light.  Mouth normal, neck supple, no meningismus.  LUNGS: Lungs clear to auscultation bilaterally.  CARDIOVASCULAR: + S1-S2, regular rate and rhythm, no murmurs.  BACK: No CVA tenderness, no midline bony tenderness.  ABDOMEN: + Bowel sounds, soft, moderate tenderness right upper quadrant, nondistended.  No rebound, no guarding, no hepatosplenomegaly.  EXTREMITIES: Full range of motion, no tenderness, good capillary refill.  SKIN: No rash, good turgor.  NEURO: Patient answers questions appropriately.  No focal deficits appreciated.    Labs Reviewed   COMP METABOLIC PANEL (14) - Abnormal; Notable for the following components:       Result Value    CO2 16.0 (*)     BUN 8 (*)     Albumin 5.0 (*)     A/G Ratio 2.1 (*)     All other components within normal limits   LIPASE - Normal   PROTHROMBIN TIME (PT) - Normal   PTT, ACTIVATED - Normal   CBC WITH DIFFERENTIAL WITH PLATELET      Outpatient CT from yesterday  1. Mild-to-moderate gallbladder distension with gallbladder wall thickening and mild pericholecystic inflammatory changes are concerning for changes of acute cholecystitis in the appropriate clinical setting.  Clinical correlation recommended.  Dedicated    abdominal ultrasound and/or HIDA scan may be helpful for further evaluation as clinically directed.       2. There is also dilatation of the common bile duct measuring up to 10 mm in diameter with a distal obstructing stone, stricture, or mass not excluded.  Clinical correlation recommended.  MRCP/ERCP may be helpful for further evaluation as clinically   directed.      3. Uncomplicated colonic diverticulosis.      4. Indeterminate enhancing lesion in the dome the liver measuring 10 x 10 mm could be further characterized with a nonemergent dedicated MRI of the abdomen with and without Eovist contrast.           MDM      Patient is NPO.  Patient  was given pain medicines.  I did speak with general surgery recommended admission and they will evaluate the patient.  I did speak with the [facility] hospitalist.  I did consider acute cholecystitis, choledocholithiasis, pancreatitis.  Surgery did not recommend antibiotics at this time.       Disposition and Plan     Clinical Impression:  1. 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      Patient was at her baseline health until 5 days prior to admission when she developed a right-sided abdominal pain.  She describes it as \"muscle cramp\".  It was tolerable at rest but exacerbated with specific movement or coughing.  She was evaluated by her PCP yesterday as part of her annual physical when she reported this pain.  Denies associated nausea, fevers, chills.  She does report night sweats for the past 2 nights.  Also describes 3 episodes daily of nonbloody diarrhea x 3 days.  Denies urinary frequency, dysuria, urgency. Patient had an outpatient CT done today which showed mild to moderate gallbladder distention and gallbladder wall thickening and mild pericholecystic inflammatory changes concerning for acute cholecystitis.  There is also dilatation of the common bile duct measuring 10 mm in diameter with a distal obstructing stone/stricture/mass.     Patient was afebrile, nontachycardic and normotensive on arrival to the ER.  CMP with mild metabolic acidosis with bicarb of 16.  AST, ALT, T bili within normal limits.  CBC is within normal limits.  Patient was given IV fluids, IV Dilaudid.  Surgery was consulted.    /77   Pulse 70   Temp 96.9 °F (36.1 °C) (Temporal)   Resp 16   Ht 5' 5\" (1.651 m)   Wt 155 lb (70.3 kg)   SpO2 100%   BMI 25.79 kg/m²   General: No acute distress,  Alert  Respiratory: No rhonchi, no wheezes  Cardiovascular: S1, S2. Regular rate and rhythm  Abdomen: Soft, right upper quadrant tenderness to palpation, non-distended, positive bowel sounds  Neuro: No new focal deficits  Extremities: No edema       Lab 09/03/24  1641 09/04/24  1332   RBC 4.78 4.63   HGB 16.0 15.5   HCT 45.6 43.7   MCV 95.4 94.4   MCH 33.5 33.5   MCHC 35.1 35.5   RDW 11.9 12.0   NEPRELIM 6.62 5.74   WBC 8.3 7.7   .0* 158.0      * 93   BUN 10 8*   CREATSERUM 0.79 0.72   EGFRCR 77 87   CA 9.9 10.1   ALB 5.2* 5.0*    137   K 4.7 4.3    106   CO2 28.0 16.0*   ALKPHO 84 79   AST 18 26   ALT 13 13   BILT 1.2* 1.1   TP 7.5 7.4         Assessment & Plan:  #Abdominal pain possibly secondary to cholecystitis/choledocholithiasis  -GI and surgery consults pending  -Keep NPO  -Supportive care  -Trend LFTs     #Incidental indeterminate liver lesion-patient will need a nonemergent dedicated MRI     #Chronic intermittent dysphagia  #Esophageal stricture s/p dilation 07/2024  -GI following     #COPD-clear breath sounds currently     #Depression-continue sertraline  #Hyperlipidemia-rosuvastatin      GI:    Reason for consultation: CBD dilation   HPI: This is a 78 yr-old female with PMhx that includes dyslipidemia, COPD (no chronic oxygen requirement), anxiety, and dysphagia s/p EGD revealing mid-distal esophageal stricture (15 mm) s/p balloon dilation to 18 mm with bx revealing lymphocytic esophagitis (7/2024; Dr Mai) who presented to ER today with persistent RUQ pain. Pt reports developing fatigue on 8/31 which progressed to RUQ pain with slight radiation to back on 9/1. Pain described as a persistent cramping sensation and her symptoms progressed to night sweats without fever and non-bloody diarrhea (3x daily over the last 2 days). No nausea, vomiting, or fevers. No recent fluctuations in wt. No chronic anticoagulants or antiplatelet agents. Pt vapes nicotine daily and drinks 2-3  glasses of wine/day. Since her dilation, she reports only 1 episode of solid food dysphagia--markedly improved compared to her baseline.   CT a/p IV (9/3--completed by PCP for RUQ pain) suggests mild-mod gallbladder distention with gallbladder wall thickening + pericholecystic inflammatory changes c/w acute cholecystitis, dilation of CBD to 10 mm, and an indeterminate enhancing lesion in the dome of the liver; labs with normal LFTs, normal lipase, normal CBC.   Pt reports improved pain levels following Dilaudid IV and has remained hemodynamically stable.   Current Medications[]Expand by Default    HYDROmorphone (Dilaudid) 1 MG/ML injection 0.5 mg  0.5 mg Intravenous Q30 Min PRN    sodium chloride 0.9% infusion  125 mL/hr Intravenous Continuous         Impression: 78 yr-old female with hx of dyslipidemia, COPD (no chronic oxygen requirement), anxiety, and dysphagia s/p EGD revealing mid-distal esophageal stricture (15 mm) s/p balloon dilation to 18 mm with bx revealing lymphocytic esophagitis (7/2024; Dr Mai) admitted today with persistent RUQ pain which started 8/31 and progressed to non-bloody diarrhea and night sweats   CT a/p IV (9/3) suggests mild-mod gallbladder distention with gallbladder wall thickening + pericholecystic inflammatory changes c/w acute cholecystitis, dilation of CBD to 10 mm, and an indeterminate enhancing lesion in the dome of the liver; labs with normal LFTs, normal lipase, normal CBC. CBD dilation may be d/t choledocholithiasis however pt may have passed a stone. Given normal LFTs and esophageal stricture will plan for MRCP to assess for biliary obstruction and if needed plan for an ERCP  Recommendations:      STAT MRI abd/MRCP--if choledocholithiasis noted then plan for ERCP under MAC with Dr Mai  General surgery evaluation to determine timing of cholecystectomy   Pain management per Hospitalist recommendations; antiemetics as needed  Contact GI if fevers develop or pain worsens  despite narcotics   Stool for C Diff + GI PCR panel; enteric isolation   Abx per general surgery recommendations   Consider timing of an MRI liver with EOVIST to assess liver lesions   CBC, CMP in AM correcting electrolytes as needed per Hospitalist recommendations      SURGERY:    78-year-old female who presents to the emergency department with complaints of abdominal pain.  The patient reports that on Saturday evening she ate a bowl of chili for dinner and subsequently on Sunday began to experience abdominal bloating and distention and right upper quadrant abdominal pain.  Workup in the emergency department revealed normal CBC, CMP with bicarb of 16,  CT scan of the abdomen and pelvis revealed a 1 cm indeterminant enhancing lesion in the liver for which MRI is recommended.  Additional 6 mm lesion which is too small to characterize is also seen.  Mild to moderate gallbladder distention with gallbladder wall thickening and pericholecystic fluid and inflammatory changes consistent with acute cholecystitis is noted.  No CT evidence of cholelithiasis.  Dilatation of the common bile duct measuring up to 10 mm in diameter is seen with a distal obstructing stone, stricture or mass which cannot be differentiated.  MRCP is recommended.  Subcentimeter hypodensities in the spleen and the kidney are seen.  Plaque noted in the aorta and iliac vessels.  Diverticulosis.  On examination, abdomen is soft, nondistended, tender to deep palpation in the right upper quadrant.  Very mildly tender to palpation in the epigastrium.  There is no evidence of guarding, rebound or percussion tenderness.     CT scan findings were reviewed in detail with Su.  At this time the recommendation is to proceed with MRCP.  MRCP is ordered for today.  If MRCP is negative for abnormality, would consider proceeding with laparoscopic cholecystectomy with intraoperative cholangiogram for acute cholecystitis definitive plan will be pending MRCP  tomorrow.  Su is comfortable with this treatment plan.  She has no further questions at this time and wishes to proceed as discussed.       Past medical history-ganglion cyst, Felicia-Barr, esophageal dysphagia for which the patient underwent recent dilatation in July 2024, benign breast biopsy  The patient is not anticoagulated  9/5:  SURGERY:    Assessment/Plan:   CT scan reveals GB distention and wall thickening suggestive of cholecystitis. No CT evidence of gallstones.  CBD is noted to be 10mm, possible distal obstructing stone/lesion. One cm liver lesion noted-indeterminate.   This prompted MRCP yesterday which confirmed the 10 mm liver lesion as likely hemangioma.  A another 10 mm cyst was noted in segment 6 and another cyst measuring 7 mm in segment 4.  There is mild diffuse intrahepatic ductal dilatation.  The CBD is 11 mm.  No evidence of choledocholithiasis.  There is a rapid tapering of the distal CBD at the pancreatic head.  Gallbladder is mildly distended.  There is no evidence of choledocholithiasis or cholelithiasis.  Pancreatic ductal dilatation to 6 mm is noted.  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  CT and MRI results discussed with Su in detail.  GI PA was also notified and the patient was seen earlier today by Dr. Forbes.  Plan for EGD with EUS and possible biopsy later today with Dr. Mai.  Su does wish to discuss CT and MRCP results with her PCP, Dr. Ybarra prior to proceeding.  Su does not have any further questions at this time.  Further recommendations will be pending diagnostic workup today including EUS.  We did discuss that this lesion may be benign versus malignant entity.  We did discuss that should ampullary lesion be present, surgical oncology would be consulted.    GI:  Reports improved RUQ pain but still there, denies fevers/chills/vomiting, MRCP without choledocholithiasis but does show dilated PD, CBD, and IH ducts.     BP 90/54 (BP Location: Left arm)   Pulse 66   Temp 98  °F (36.7 °C) (Oral)   Resp 16   Ht 5' 5\" (1.651 m)   Wt 155 lb (70.3 kg)   SpO2 98%   BMI 25.79 kg/m²   Gen: AAOx3  CV: RRR with normal S1 / S2  Resp: CTA bilaterally  Abd: (+)BS, soft, non-tender, non-distended; no rebound or guarding    Lab 09/03/24  1641 09/04/24  1332 09/05/24  0550   WBC 8.3 7.7 5.7   HGB 16.0 15.5 13.1   .0* 158.0 131.0*       137 141    K 4.7 4.3 4.3     106 110    CO2 28.0 16.0* 25.0    BUN 10 8* 8*    CREATSERUM 0.79 0.72 0.66       ALT 13 13 158*    AST 18 26 217*      MRI ABD   1.  Dilated intrahepatic ducts,  common bile duct, and pancreatic duct without definite evidence of mass lesion or stone.  This finding could represent a benign or malignant stricture within the distal duct versus an ampullary tumor.  Further evaluation with ERCP is recommended.  2. Liver lesions representing hemangioma at the dome and small cysts within the remainder of the liver.   Assessment    78 yr-old female with hx of dyslipidemia, COPD (no chronic oxygen requirement), anxiety, and dysphagia s/p EGD revealing mid-distal esophageal stricture (15 mm) s/p balloon dilation to 18 mm with bx revealing lymphocytic esophagitis (7/2024; Dr Mai) admitted with persistent RUQ pain which started 8/31 and progressed to non-bloody diarrhea and night sweats, with CT showing gallbladder distension/inflammatory changes, and MRI MRCP showing CB, PD, and IH ductal dilation. Ddx includes choledocholithiasis, ampullary stricture, ampullary/pancreatic mass.         Plan   -NPO  -EUS with possible ERCP with Dr. Mai today, discussed case with him; EUS and possible ERCP with the assistance of MAC anesthesia for further evaluation-the risks, benefits and alternatives were discussed, including the risk of the pancreatitis, anesthesia and the risk of perforation and bleeding with the procedure itself.  The risks of not proceeding were also discussed, including the risks of missing lesions including  polyps or masses. The patient expresses an understanding and agrees to proceed as planned  -appreciate surgery reccs  -IV antibiotics per primary team  -continue to trend CMP     ERCP  PREOPERATIVE DIAGNOSIS/INDICATION: Abnormal LFT's, abnormal imaging of biliary tree CT/MRI  POSTOPERTATIVE DIAGNOSIS: Distal bile duct lesion r/o malignancy, sludge  PROCEDURE PERFORMED: ERCP with biliary sphincterotomy, balloon sweep and biopsy    DUODENUM: Normal  MAJOR AMPULLA:Normal  ERP: Not attempted  ERC: The CBD showed dilatation and sludge, the distal CBD showed a fleshy friable lesion prolapsing through the ampulla with balloon sweep, the CHD, the confluence of the right and left hepatic ducts and the intrahepatics were mildly dilated, the cystic duct and the gallbladder were not visualized  THERAPEUTICS: The CBD was cannulated using a standard 0.035 [employer] scientific  Hydratome RX44, 20mm cut wire, deep cannulation was performed with the help of a 0.035 straight Acrobat wire 260cm in length, an adequate biliary sphincterotomy was performed with standard ERBE settings, there were no immediate complication noted. Biliary balloon stone extraction was performed the help of [employer]’s 9-12mm triple lumen stone extraction balloon, sludge was retrieved. Distal bile duct mass biopsies were performed with cold forceps  RECOMMENDATIONS:   Post ERCP precautions, watch for bleeding, infection, perforation, adverse drug reactions and pancreatitis.  CMP, CBC in AM.  HOSPITALIST:  Patient resting in bed and reports some abdominal discomfort.  She denies any nausea, vomiting      Vital signs:  Temp:  [96.9 °F (36.1 °C)-98.4 °F (36.9 °C)] 98.4 °F (36.9 °C)  Pulse:  [63-95] 75  Resp:  [16-18] 16  BP: ()/(54-88) 117/62  SpO2:  [93 %-100 %] 93 %     Physical Exam:    General: No acute distress  Respiratory: No wheezes, no rhonchi  Cardiovascular: S1, S2, regular rate and rhythm  Abdomen: Soft, Non-tender, non-distended, positive  bowel sounds  Neuro: No new focal deficits.   Extremities: No edema      Scheduled Medications[]Expand by Default    levofloxacin  750 mg Intravenous Q24H    sertraline  25 mg Oral Daily    rosuvastatin  20 mg Oral Daily    [Held by provider] enoxaparin  40 mg Subcutaneous Daily                  Assessment & Plan:  #Acute abdominal pain  #Dilated common bile duct, pancreatic duct, intrahepatic duct  -CT abdomen pelvis reviewed  -MRCP reviewed  -GI panel in lab  -C. difficile in lab  -Plan for ERCP today  -GI following  -Surgery following     #UTI  -Urine culture in lab  -IV antibiotics     #Incidental indeterminate liver lesion  -Patient will need nonemergent dedicated MRI     #Chronic intermittent dysphagia  #Esophageal stricture s/p dilation 07/2024  -GI following     #COPD     #Hyperlipidemia  -Statin     #Depression  -Sertraline    Medications 09/04/24 09/05/24 09/06/24   lactated ringers infusion  Rate: 100 mL/hr  Freq: Continuous Route: IV  Start: 09/05/24 1615 End: 09/05/24 1944     (1615 AU)-Not Given   1944-D/C'd        sodium chloride 0.9% infusion  Rate: 100 mL/hr  Freq: Continuous Route: IV  Start: 09/04/24 1700 End: 09/06/24 1445    1737 JH-New Bag        0610 AB-New Bag   2113 AB-New Bag            levoFLOXacin in dextrose 5% (Levaquin) 750 mg/150mL IVPB premix 750 mg  Dose: 750 mg  Freq: Every 24 hours Route: IV  Last Dose: Stopped (09/06/24 0951)  Start: 09/05/24 0915 End: 09/06/24 0928

## 2024-09-10 NOTE — PAYOR COMM NOTE
--------------  REQUEST FOR RECONSIDERATION    ADMISSION REVIEW     Payor: [employer] [facility]  Subscriber #:  [id number]  Authorization Number: [id number]    ADMIT TO INPT STATUS 9/5/24  ADMIT TO OBSERVATION 9/4/24 9/4 Patient Seen in: [facility] Emergency Department    History   Stated Complaint: + abd pain Denies NVD. Had CT abd yesterday sent for eval for cholescystitis    Patient is a 78-year-old female who states for the past 3 days she has had right upper quadrant abdominal pain.  Patient states she had nausea but no vomiting.  Patient states she did not have a fever but was sweating at night.  Patient states pain is 5-6 out of 10.  Patient states is dull ache, no radiation.  No chest pain, shortness of breath.  No hematuria or dysuria, no diarrhea.  Patient saw her doctor yesterday had outpatient blood work and outpatient CT showing cholecystitis.  Her doctor recommended she come last night but patient waited and came in today.  Patient states she is still having pain.  Patient has not had anything to eat since Monday.  Remainder of review of systems negative.    Objective:   Past Medical History:    Body piercing    Ears    Calculus of kidney    COPD (chronic obstructive pulmonary disease) (HCC)    Decorative tattoo    Depression    Felicia Swain virus infection    High cholesterol    Slight elevation    Painful swallowing    At times    Pneumonia due to organism    Problems with swallowing    Years ago    Renal disorder    Sinus infection     Past Surgical History:   Procedure Laterality Date    Biopsy/removal, lymph node(s)      David biopsy stereo nodule 1 site right (cpt=19081)      2015    Tonsillectomy  1953     Physical Exam     ED Triage Vitals [09/04/24 1221]   /88   Pulse 95   Resp 16   Temp 96.9 °F (36.1 °C)   Temp src Temporal   SpO2 95 %   O2 Device None (Room air)     Current Vitals:   Vital Signs  BP: 133/77  Pulse: 70  Resp: 16  Temp: 96.9 °F (36.1 °C)  Temp src: Temporal  MAP (mmHg):  95    Physical Exam  GENERAL: Patient resting on the cart in no acute distress.  HEENT: Extraocular muscles intact, pupils equal round reactive to light.  Mouth normal, neck supple, no meningismus.  LUNGS: Lungs clear to auscultation bilaterally.  CARDIOVASCULAR: + S1-S2, regular rate and rhythm, no murmurs.  BACK: No CVA tenderness, no midline bony tenderness.  ABDOMEN: + Bowel sounds, soft, moderate tenderness right upper quadrant, nondistended.  No rebound, no guarding, no hepatosplenomegaly.  EXTREMITIES: Full range of motion, no tenderness, good capillary refill.  SKIN: No rash, good turgor.  NEURO: Patient answers questions appropriately.  No focal deficits appreciated.    Labs Reviewed   COMP METABOLIC PANEL (14) - Abnormal; Notable for the following components:       Result Value    CO2 16.0 (*)     BUN 8 (*)     Albumin 5.0 (*)     A/G Ratio 2.1 (*)     All other components within normal limits   LIPASE - Normal   PROTHROMBIN TIME (PT) - Normal   PTT, ACTIVATED - Normal   CBC WITH DIFFERENTIAL WITH PLATELET      Outpatient CT from yesterday  1. Mild-to-moderate gallbladder distension with gallbladder wall thickening and mild pericholecystic inflammatory changes are concerning for changes of acute cholecystitis in the appropriate clinical setting.  Clinical correlation recommended.  Dedicated    abdominal ultrasound and/or HIDA scan may be helpful for further evaluation as clinically directed.       2. There is also dilatation of the common bile duct measuring up to 10 mm in diameter with a distal obstructing stone, stricture, or mass not excluded.  Clinical correlation recommended.  MRCP/ERCP may be helpful for further evaluation as clinically   directed.      3. Uncomplicated colonic diverticulosis.      4. Indeterminate enhancing lesion in the dome the liver measuring 10 x 10 mm could be further characterized with a nonemergent dedicated MRI of the abdomen with and without Eovist contrast.           MDM       Patient is NPO.  Patient was given pain medicines.  I did speak with general surgery recommended admission and they will evaluate the patient.  [facility]  I did consider acute cholecystitis, choledocholithiasis, pancreatitis.  Surgery did not recommend antibiotics at this time.       Disposition and Plan     Clinical Impression:  1. 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      Patient was at her baseline health until 5 days prior to admission when she developed a right-sided abdominal pain.  She describes it as \"muscle cramp\".  It was tolerable at rest but exacerbated with specific movement or coughing.  She was evaluated by her PCP yesterday as part of her annual physical when she reported this pain.  Denies associated nausea, fevers, chills.  She does report night sweats for the past 2 nights.  Also describes 3 episodes daily of nonbloody diarrhea x 3 days.  Denies urinary frequency, dysuria, urgency. Patient had an outpatient CT done today which showed mild to moderate gallbladder distention and gallbladder wall thickening and mild pericholecystic inflammatory changes concerning for acute cholecystitis.  There is also dilatation of the common bile duct measuring 10 mm in diameter with a distal obstructing stone/stricture/mass.     Patient was afebrile, nontachycardic and normotensive on arrival to the ER.  CMP with mild metabolic acidosis with bicarb of 16.  AST, ALT, T bili within normal limits.  CBC is within normal limits.  Patient was given IV fluids, IV Dilaudid.  Surgery was consulted.    /77   Pulse 70   Temp 96.9 °F (36.1 °C) (Temporal)   Resp 16   Ht 5' 5\" (1.651 m)   Wt 155 lb (70.3 kg)   SpO2 100%   BMI 25.79 kg/m²   General:  No acute distress, Alert  Respiratory: No rhonchi, no wheezes  Cardiovascular: S1, S2. Regular rate and rhythm  Abdomen: Soft, right upper quadrant tenderness to palpation, non-distended, positive bowel sounds  Neuro: No new focal deficits  Extremities: No edema       Lab 09/03/24  1641 09/04/24  1332   RBC 4.78 4.63   HGB 16.0 15.5   HCT 45.6 43.7   MCV 95.4 94.4   MCH 33.5 33.5   MCHC 35.1 35.5   RDW 11.9 12.0   NEPRELIM 6.62 5.74   WBC 8.3 7.7   .0* 158.0      * 93   BUN 10 8*   CREATSERUM 0.79 0.72   EGFRCR 77 87   CA 9.9 10.1   ALB 5.2* 5.0*    137   K 4.7 4.3    106   CO2 28.0 16.0*   ALKPHO 84 79   AST 18 26   ALT 13 13   BILT 1.2* 1.1   TP 7.5 7.4         Assessment & Plan:  #Abdominal pain possibly secondary to cholecystitis/choledocholithiasis  -GI and surgery consults pending  -Keep NPO  -Supportive care  -Trend LFTs     #Incidental indeterminate liver lesion-patient will need a nonemergent dedicated MRI     #Chronic intermittent dysphagia  #Esophageal stricture s/p dilation 07/2024  -GI following     #COPD-clear breath sounds currently     #Depression-continue sertraline  #Hyperlipidemia-rosuvastatin      GI:    Reason for consultation: CBD dilation   HPI: This is a 78 yr-old female with PMhx that includes dyslipidemia, COPD (no chronic oxygen requirement), anxiety, and dysphagia s/p EGD revealing mid-distal esophageal stricture (15 mm) s/p balloon dilation to 18 mm with bx revealing lymphocytic esophagitis (7/2024; Dr Mai) who presented to ER today with persistent RUQ pain. Pt reports developing fatigue on 8/31 which progressed to RUQ pain with slight radiation to back on 9/1. Pain described as a persistent cramping sensation and her symptoms progressed to night sweats without fever and non-bloody diarrhea (3x daily over the last 2 days). No nausea, vomiting, or fevers. No recent fluctuations in wt. No chronic anticoagulants or antiplatelet agents. Pt vapes nicotine daily  and drinks 2-3 glasses of wine/day. Since her dilation, she reports only 1 episode of solid food dysphagia--markedly improved compared to her baseline.   CT a/p IV (9/3--completed by PCP for RUQ pain) suggests mild-mod gallbladder distention with gallbladder wall thickening + pericholecystic inflammatory changes c/w acute cholecystitis, dilation of CBD to 10 mm, and an indeterminate enhancing lesion in the dome of the liver; labs with normal LFTs, normal lipase, normal CBC.   Pt reports improved pain levels following Dilaudid IV and has remained hemodynamically stable.   Current Medications[]Expand by Default    HYDROmorphone (Dilaudid) 1 MG/ML injection 0.5 mg  0.5 mg Intravenous Q30 Min PRN    sodium chloride 0.9% infusion  125 mL/hr Intravenous Continuous         Impression: 78 yr-old female with hx of dyslipidemia, COPD (no chronic oxygen requirement), anxiety, and dysphagia s/p EGD revealing mid-distal esophageal stricture (15 mm) s/p balloon dilation to 18 mm with bx revealing lymphocytic esophagitis (7/2024; Dr Mai) admitted today with persistent RUQ pain which started 8/31 and progressed to non-bloody diarrhea and night sweats   CT a/p IV (9/3) suggests mild-mod gallbladder distention with gallbladder wall thickening + pericholecystic inflammatory changes c/w acute cholecystitis, dilation of CBD to 10 mm, and an indeterminate enhancing lesion in the dome of the liver; labs with normal LFTs, normal lipase, normal CBC. CBD dilation may be d/t choledocholithiasis however pt may have passed a stone. Given normal LFTs and esophageal stricture will plan for MRCP to assess for biliary obstruction and if needed plan for an ERCP  Recommendations:      STAT MRI abd/MRCP--if choledocholithiasis noted then plan for ERCP under MAC with Dr Mai  General surgery evaluation to determine timing of cholecystectomy   Pain management per Hospitalist recommendations; antiemetics as needed  Contact GI if fevers develop  or pain worsens despite narcotics   Stool for C Diff + GI PCR panel; enteric isolation   Abx per general surgery recommendations   Consider timing of an MRI liver with EOVIST to assess liver lesions   CBC, CMP in AM correcting electrolytes as needed per Hospitalist recommendations      SURGERY:    78-year-old female who presents to the emergency department with complaints of abdominal pain.  The patient reports that on Saturday evening she ate a bowl of chili for dinner and subsequently on Sunday began to experience abdominal bloating and distention and right upper quadrant abdominal pain.  Workup in the emergency department revealed normal CBC, CMP with bicarb of 16,  CT scan of the abdomen and pelvis revealed a 1 cm indeterminant enhancing lesion in the liver for which MRI is recommended.  Additional 6 mm lesion which is too small to characterize is also seen.  Mild to moderate gallbladder distention with gallbladder wall thickening and pericholecystic fluid and inflammatory changes consistent with acute cholecystitis is noted.  No CT evidence of cholelithiasis.  Dilatation of the common bile duct measuring up to 10 mm in diameter is seen with a distal obstructing stone, stricture or mass which cannot be differentiated.  MRCP is recommended.  Subcentimeter hypodensities in the spleen and the kidney are seen.  Plaque noted in the aorta and iliac vessels.  Diverticulosis.  On examination, abdomen is soft, nondistended, tender to deep palpation in the right upper quadrant.  Very mildly tender to palpation in the epigastrium.  There is no evidence of guarding, rebound or percussion tenderness.     CT scan findings were reviewed in detail with Su.  At this time the recommendation is to proceed with MRCP.  MRCP is ordered for today.  If MRCP is negative for abnormality, would consider proceeding with laparoscopic cholecystectomy with intraoperative cholangiogram for acute cholecystitis definitive plan will be pending  MRCP tomorrow.  Su is comfortable with this treatment plan.  She has no further questions at this time and wishes to proceed as discussed.       Past medical history-ganglion cyst, Felicia-Barr, esophageal dysphagia for which the patient underwent recent dilatation in July 2024, benign breast biopsy  The patient is not anticoagulated  9/5:  SURGERY:    Assessment/Plan:   CT scan reveals GB distention and wall thickening suggestive of cholecystitis. No CT evidence of gallstones.  CBD is noted to be 10mm, possible distal obstructing stone/lesion. One cm liver lesion noted-indeterminate.   This prompted MRCP yesterday which confirmed the 10 mm liver lesion as likely hemangioma.  A another 10 mm cyst was noted in segment 6 and another cyst measuring 7 mm in segment 4.  There is mild diffuse intrahepatic ductal dilatation.  The CBD is 11 mm.  No evidence of choledocholithiasis.  There is a rapid tapering of the distal CBD at the pancreatic head.  Gallbladder is mildly distended.  There is no evidence of choledocholithiasis or cholelithiasis.  Pancreatic ductal dilatation to 6 mm is noted.  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  CT and MRI results discussed with Su in detail.  GI PA was also notified and the patient was seen earlier today by Dr. Forbes.  Plan for EGD with EUS and possible biopsy later today with Dr. Mai.  Su does wish to discuss CT and MRCP results with her PCP, Dr. Ybarra prior to proceeding.  Su does not have any further questions at this time.  Further recommendations will be pending diagnostic workup today including EUS.  We did discuss that this lesion may be benign versus malignant entity.  We did discuss that should ampullary lesion be present, surgical oncology would be consulted.    GI:  Reports improved RUQ pain but still there, denies fevers/chills/vomiting, MRCP without choledocholithiasis but does show dilated PD, CBD, and IH ducts.     BP 90/54 (BP Location: Left arm)   Pulse 66    Temp 98 °F (36.7 °C) (Oral)   Resp 16   Ht 5' 5\" (1.651 m)   Wt 155 lb (70.3 kg)   SpO2 98%   BMI 25.79 kg/m²   Gen: AAOx3  CV: RRR with normal S1 / S2  Resp: CTA bilaterally  Abd: (+)BS, soft, non-tender, non-distended; no rebound or guarding    Lab 09/03/24  1641 09/04/24  1332 09/05/24  0550   WBC 8.3 7.7 5.7   HGB 16.0 15.5 13.1   .0* 158.0 131.0*       137 141    K 4.7 4.3 4.3     106 110    CO2 28.0 16.0* 25.0    BUN 10 8* 8*    CREATSERUM 0.79 0.72 0.66       ALT 13 13 158*    AST 18 26 217*      MRI ABD   1.  Dilated intrahepatic ducts,  common bile duct, and pancreatic duct without definite evidence of mass lesion or stone.  This finding could represent a benign or malignant stricture within the distal duct versus an ampullary tumor.  Further evaluation with ERCP is recommended.  2. Liver lesions representing hemangioma at the dome and small cysts within the remainder of the liver.   Assessment    78 yr-old female with hx of dyslipidemia, COPD (no chronic oxygen requirement), anxiety, and dysphagia s/p EGD revealing mid-distal esophageal stricture (15 mm) s/p balloon dilation to 18 mm with bx revealing lymphocytic esophagitis (7/2024; Dr Mai) admitted with persistent RUQ pain which started 8/31 and progressed to non-bloody diarrhea and night sweats, with CT showing gallbladder distension/inflammatory changes, and MRI MRCP showing CB, PD, and IH ductal dilation. Ddx includes choledocholithiasis, ampullary stricture, ampullary/pancreatic mass.         Plan   -NPO  -EUS with possible ERCP with Dr. Mai today, discussed case with him; EUS and possible ERCP with the assistance of MAC anesthesia for further evaluation-the risks, benefits and alternatives were discussed, including the risk of the pancreatitis, anesthesia and the risk of perforation and bleeding with the procedure itself.  The risks of not proceeding were also discussed, including the risks of missing lesions  including polyps or masses. The patient expresses an understanding and agrees to proceed as planned  -appreciate surgery reccs  -IV antibiotics per primary team  -continue to trend CMP     ERCP  PREOPERATIVE DIAGNOSIS/INDICATION: Abnormal LFT's, abnormal imaging of biliary tree CT/MRI  POSTOPERTATIVE DIAGNOSIS: Distal bile duct lesion r/o malignancy, sludge  PROCEDURE PERFORMED: ERCP with biliary sphincterotomy, balloon sweep and biopsy    DUODENUM: Normal  MAJOR AMPULLA:Normal  ERP: Not attempted  ERC: The CBD showed dilatation and sludge, the distal CBD showed a fleshy friable lesion prolapsing through the ampulla with balloon sweep, the CHD, the confluence of the right and left hepatic ducts and the intrahepatics were mildly dilated, the cystic duct and the gallbladder were not visualized  THERAPEUTICS: The CBD was cannulated using a standard 0.035 [employer]  Hydratome RX44, 20mm cut wire, deep cannulation was performed with the help of a 0.035 straight [employer]ro[employer] wire 260cm in length, an adequate biliary sphincterotomy was performed with standard ERBE settings, there were no immediate complication noted. Biliary balloon stone extraction was performed the help of [employer]’s 9-12mm triple lumen stone extraction balloon, sludge was retrieved. Distal bile duct mass biopsies were performed with cold forceps  RECOMMENDATIONS:   Post ERCP precautions, watch for bleeding, infection, perforation, adverse drug reactions and pancreatitis.  CMP, CBC in AM.  HOSPITALIST:  Patient resting in bed and reports some abdominal discomfort.  She denies any nausea, vomiting      Vital signs:  Temp:  [96.9 °F (36.1 °C)-98.4 °F (36.9 °C)] 98.4 °F (36.9 °C)  Pulse:  [63-95] 75  Resp:  [16-18] 16  BP: ()/(54-88) 117/62  SpO2:  [93 %-100 %] 93 %     Physical Exam:    General: No acute distress  Respiratory: No wheezes, no rhonchi  Cardiovascular: S1, S2, regular rate and rhythm  Abdomen: Soft, Non-tender,  non-distended, positive bowel sounds  Neuro: No new focal deficits.   Extremities: No edema      Scheduled Medications[]Expand by Default    levofloxacin  750 mg Intravenous Q24H    sertraline  25 mg Oral Daily    rosuvastatin  20 mg Oral Daily    [Held by provider] enoxaparin  40 mg Subcutaneous Daily                  Assessment & Plan:  #Acute abdominal pain  #Dilated common bile duct, pancreatic duct, intrahepatic duct  -CT abdomen pelvis reviewed  -MRCP reviewed  -GI panel in lab  -C. difficile in lab  -Plan for ERCP today  -GI following  -Surgery following     #UTI  -Urine culture in lab  -IV antibiotics     #Incidental indeterminate liver lesion  -Patient will need nonemergent dedicated MRI     #Chronic intermittent dysphagia  #Esophageal stricture s/p dilation 07/2024  -GI following     #COPD     #Hyperlipidemia  -Statin     #Depression  -Sertraline    Medications 09/04/24 09/05/24 09/06/24   lactated ringers infusion  Rate: 100 mL/hr  Freq: Continuous Route: IV  Start: 09/05/24 1615 End: 09/05/24 1944     (1615 AU)-Not Given   1944-D/C'd        sodium chloride 0.9% infusion  Rate: 100 mL/hr  Freq: Continuous Route: IV  Start: 09/04/24 1700 End: 09/06/24 1445    1737 JH-New Bag        0610 AB-New Bag   2113 AB-New Bag            levoFLOXacin in dextrose 5% (Levaquin) 750 mg/150mL IVPB premix 750 mg  Dose: 750 mg  Freq: Every 24 hours Route: IV  Last Dose: Stopped (09/06/24 0951)  Start: 09/05/24 0915 End: 09/06/24 0928

## 2024-10-18 PROBLEM — D69.6 THROMBOCYTOPENIA (HCC): Chronic | Status: ACTIVE | Noted: 2024-10-18

## 2024-10-23 ENCOUNTER — APPOINTMENT (OUTPATIENT)
Dept: ADMINISTRATIVE | Facility: HOSPITAL | Age: 79
End: 2024-10-23
Payer: MEDICARE

## 2024-11-19 ENCOUNTER — ANESTHESIA (OUTPATIENT)
Dept: ENDOSCOPY | Facility: HOSPITAL | Age: 79
End: 2024-11-19
Payer: MEDICARE

## 2024-11-19 ENCOUNTER — APPOINTMENT (OUTPATIENT)
Dept: GENERAL RADIOLOGY | Facility: HOSPITAL | Age: 79
End: 2024-11-19
Attending: INTERNAL MEDICINE
Payer: MEDICARE

## 2024-11-19 ENCOUNTER — ANESTHESIA EVENT (OUTPATIENT)
Dept: ENDOSCOPY | Facility: HOSPITAL | Age: 79
End: 2024-11-19
Payer: MEDICARE

## 2024-11-19 ENCOUNTER — HOSPITAL ENCOUNTER (OUTPATIENT)
Facility: HOSPITAL | Age: 79
Setting detail: HOSPITAL OUTPATIENT SURGERY
Discharge: HOME | End: 2024-11-19
Attending: INTERNAL MEDICINE | Admitting: INTERNAL MEDICINE
Payer: MEDICARE

## 2024-11-19 VITALS
WEIGHT: 157 LBS | HEART RATE: 69 BPM | RESPIRATION RATE: 15 BRPM | HEIGHT: 65 IN | BODY MASS INDEX: 26.16 KG/M2 | TEMPERATURE: 98 F | DIASTOLIC BLOOD PRESSURE: 73 MMHG | SYSTOLIC BLOOD PRESSURE: 137 MMHG | OXYGEN SATURATION: 96 %

## 2024-11-19 DIAGNOSIS — K83.1 COMMON BILE DUCT (CBD) STRICTURE (HCC): ICD-10-CM

## 2024-11-19 DIAGNOSIS — K20.80 LYMPHOCYTIC ESOPHAGITIS: ICD-10-CM

## 2024-11-19 DIAGNOSIS — R13.19 ESOPHAGEAL DYSPHAGIA: ICD-10-CM

## 2024-11-19 DIAGNOSIS — R13.10 DYSPHAGIA, UNSPECIFIED TYPE: ICD-10-CM

## 2024-11-19 PROCEDURE — 0D738ZZ DILATION OF LOWER ESOPHAGUS, VIA NATURAL OR ARTIFICIAL OPENING ENDOSCOPIC: ICD-10-PCS | Performed by: INTERNAL MEDICINE

## 2024-11-19 PROCEDURE — 88342 IMHCHEM/IMCYTCHM 1ST ANTB: CPT | Performed by: INTERNAL MEDICINE

## 2024-11-19 PROCEDURE — 0DB58ZX EXCISION OF ESOPHAGUS, VIA NATURAL OR ARTIFICIAL OPENING ENDOSCOPIC, DIAGNOSTIC: ICD-10-PCS | Performed by: INTERNAL MEDICINE

## 2024-11-19 PROCEDURE — 88305 TISSUE EXAM BY PATHOLOGIST: CPT | Performed by: INTERNAL MEDICINE

## 2024-11-19 PROCEDURE — 74328 X-RAY BILE DUCT ENDOSCOPY: CPT | Performed by: INTERNAL MEDICINE

## 2024-11-19 PROCEDURE — 0D728ZZ DILATION OF MIDDLE ESOPHAGUS, VIA NATURAL OR ARTIFICIAL OPENING ENDOSCOPIC: ICD-10-PCS | Performed by: INTERNAL MEDICINE

## 2024-11-19 PROCEDURE — 0FC98ZZ EXTIRPATION OF MATTER FROM COMMON BILE DUCT, VIA NATURAL OR ARTIFICIAL OPENING ENDOSCOPIC: ICD-10-PCS | Performed by: INTERNAL MEDICINE

## 2024-11-19 PROCEDURE — 0DB98ZX EXCISION OF DUODENUM, VIA NATURAL OR ARTIFICIAL OPENING ENDOSCOPIC, DIAGNOSTIC: ICD-10-PCS | Performed by: INTERNAL MEDICINE

## 2024-11-19 RX ORDER — NALOXONE HYDROCHLORIDE 0.4 MG/ML
0.08 INJECTION, SOLUTION INTRAMUSCULAR; INTRAVENOUS; SUBCUTANEOUS ONCE AS NEEDED
OUTPATIENT
Start: 2024-11-19 | End: 2024-11-19

## 2024-11-19 RX ORDER — SODIUM CHLORIDE, SODIUM LACTATE, POTASSIUM CHLORIDE, CALCIUM CHLORIDE 600; 310; 30; 20 MG/100ML; MG/100ML; MG/100ML; MG/100ML
INJECTION, SOLUTION INTRAVENOUS CONTINUOUS
Status: DISCONTINUED | OUTPATIENT
Start: 2024-11-19 | End: 2024-11-19

## 2024-11-19 RX ORDER — SODIUM CHLORIDE, SODIUM LACTATE, POTASSIUM CHLORIDE, CALCIUM CHLORIDE 600; 310; 30; 20 MG/100ML; MG/100ML; MG/100ML; MG/100ML
INJECTION, SOLUTION INTRAVENOUS CONTINUOUS PRN
Status: DISCONTINUED | OUTPATIENT
Start: 2024-11-19 | End: 2024-11-19 | Stop reason: SURG

## 2024-11-19 RX ORDER — INDOMETHACIN 100 MG
100 SUPPOSITORY, RECTAL RECTAL ONCE
Status: DISCONTINUED | OUTPATIENT
Start: 2024-11-19 | End: 2024-11-19

## 2024-11-19 RX ORDER — SODIUM CHLORIDE, SODIUM LACTATE, POTASSIUM CHLORIDE, CALCIUM CHLORIDE 600; 310; 30; 20 MG/100ML; MG/100ML; MG/100ML; MG/100ML
100 INJECTION, SOLUTION INTRAVENOUS CONTINUOUS
Status: DISCONTINUED | OUTPATIENT
Start: 2024-11-19 | End: 2024-11-19

## 2024-11-19 RX ORDER — LIDOCAINE HYDROCHLORIDE 10 MG/ML
INJECTION, SOLUTION EPIDURAL; INFILTRATION; INTRACAUDAL; PERINEURAL AS NEEDED
Status: DISCONTINUED | OUTPATIENT
Start: 2024-11-19 | End: 2024-11-19 | Stop reason: SURG

## 2024-11-19 RX ORDER — SODIUM CHLORIDE, SODIUM LACTATE, POTASSIUM CHLORIDE, CALCIUM CHLORIDE 600; 310; 30; 20 MG/100ML; MG/100ML; MG/100ML; MG/100ML
INJECTION, SOLUTION INTRAVENOUS CONTINUOUS
OUTPATIENT
Start: 2024-11-19

## 2024-11-19 RX ORDER — MIDAZOLAM HYDROCHLORIDE 1 MG/ML
INJECTION INTRAMUSCULAR; INTRAVENOUS AS NEEDED
Status: DISCONTINUED | OUTPATIENT
Start: 2024-11-19 | End: 2024-11-19 | Stop reason: SURG

## 2024-11-19 RX ADMIN — MIDAZOLAM HYDROCHLORIDE 2 MG: 1 INJECTION INTRAMUSCULAR; INTRAVENOUS at 11:31:00

## 2024-11-19 RX ADMIN — SODIUM CHLORIDE, SODIUM LACTATE, POTASSIUM CHLORIDE, CALCIUM CHLORIDE: 600; 310; 30; 20 INJECTION, SOLUTION INTRAVENOUS at 11:31:00

## 2024-11-19 RX ADMIN — LIDOCAINE HYDROCHLORIDE 50 MG: 10 INJECTION, SOLUTION EPIDURAL; INFILTRATION; INTRACAUDAL; PERINEURAL at 11:43:00

## 2024-11-19 NOTE — ANESTHESIA POSTPROCEDURE EVALUATION
[facility]    Su Christensen Ace Patient Status:  Hospital Outpatient Surgery   Age/Gender 79 year old female MRN [id number]   Location [facility] ENDOSCOPY [facility]IN [facility] Attending Elijah Mai MD   Hosp Day # 0 PCP Liliana Ybarra MD       Anesthesia Post-op Note    ESOPHAGOGASTRODUODENOSCOPY (EGD) with biopsies and balloon dilation to 16.5mm, ENDOSCOPIC ULTRASOUND (EUS),ENDOSCOPIC RETROGRADE CHOLANGIOPANCREATOGRAPHY (ERCP) with balloon sweep and biopsies    Procedure Summary       Date: 11/19/24 Room / Location:  ENDOSCOPY 01 /  ENDOSCOPY    Anesthesia Start: 1136 Anesthesia Stop: 1217    Procedures:       ESOPHAGOGASTRODUODENOSCOPY (EGD) with biopsies and balloon dilation to 16.5mm, ENDOSCOPIC ULTRASOUND (EUS),ENDOSCOPIC RETROGRADE CHOLANGIOPANCREATOGRAPHY (ERCP) with balloon sweep and biopsies      ENDOSCOPIC ULTRASOUND (EUS)      ENDOSCOPIC RETROGRADE CHOLANGIOPANCREATOGRAPHY (ERCP) Diagnosis:       Dysphagia, unspecified type      Common bile duct (CBD) stricture (HCC)      Esophageal dysphagia      Lymphocytic esophagitis      (EGD: esophageal stricture EUS: normal ERCP: inflamed ampulla)    Surgeons: Elijah Mai MD Anesthesiologist: Berny Rordigez MD    Anesthesia Type: MAC ASA Status: 3            Anesthesia Type: MAC    Vitals Value Taken Time   /64 11/19/24 1216   Temp  11/19/24 1220   Pulse 76 11/19/24 1220   Resp 16 11/19/24 1220   SpO2 98 % 11/19/24 1220   Vitals shown include unfiled device data.    Patient Location: Endoscopy    Anesthesia Type: MAC    Airway Patency: patent    Postop Pain Control: adequate    Mental Status: mildly sedated but able to meaningfully participate in the post-anesthesia evaluation    Nausea/Vomiting: none    Cardiopulmonary/Hydration status: stable euvolemic    Complications: no apparent anesthesia related complications    Postop vital signs: stable    Dental Exam: Unchanged from Preop    Patient to be discharged home when criteria  met.

## 2024-11-19 NOTE — ANESTHESIA PREPROCEDURE EVALUATION
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  Temp: 97.5 °F (36.4 °C)  Pulse: 74  Resp: 18  BP: 123/69  SpO2: 95 %  Body mass index is 26.13 kg/m².    Current medications reviewed.  Hospital Medications:  • lactated ringers infusion   Intravenous Continuous   • lactated ringers IV bolus 1,000 mL  1,000 mL Intravenous Once    Followed by   • lactated ringers infusion  100 mL/hr Intravenous Continuous   • indomethacin (Indocin) 100 MG rectal suppository 100 mg  100 mg Rectal Once       Outpatient Medications:   Prescriptions Prior to Admission[1]    Allergies: Augmentin [amoxicillin-pot clavulanate], Penicillins, and Keflex [cephalexin]      Anesthesia Evaluation    Patient summary reviewed.    Anesthetic Complications  (-) history of anesthetic complications         GI/Hepatic/Renal                                 Cardiovascular      ECG reviewed.  Exercise tolerance: good     MET: >4         (+) hyperlipidemia                                  Endo/Other                                  Pulmonary        (+) COPD       (+) shortness of breath  (-) recent URI          Neuro/Psych                              Patient Active Problem List:     Ganglion of  wrist, right     Pseudophakia of both eyes     Posterior vitreous detachment of both eyes     CARRIZALES (dyspnea on exertion)     Abscess of parotid gland     Acute parotitis     EBV (Felicia-Barr virus) viremia     Hypocomplementemia (HCC)     Hypogammaglobulinemia (HCC)     Esophageal dysphagia     Problems with swallowing and mastication     Acute cholecystitis     Calculus of gallbladder with acute cholecystitis and obstruction     Common bile duct (CBD) stricture (HCC)     Common bile duct dilation     Dilation of pancreatic duct (HCC)     Intrahepatic bile duct dilation     Generalized abdominal pain     Acute cystitis without hematuria        Past Surgical History:   Procedure Laterality Date   • Biopsy/removal, lymph node(s)     • Cataract     • David biopsy stereo nodule 1 site right (cpt=19081)         • Tonsillectomy     • Upper gi endoscopy,exam       Social History     Socioeconomic History   • Marital status:    Tobacco Use   • Smoking status: Former     Current packs/day: 0.00     Average packs/day: 1 pack/day for 35.0 years (35.0 ttl pk-yrs)     Types: Cigarettes     Quit date: 9/15/2018     Years since quittin.1   • Smokeless tobacco: Never   • Tobacco comments:     Off and on   Vaping Use   • Vaping status: Every Day   • Substances: Nicotine   • Devices: Pre-filled or refillable cartridge   Substance and Sexual Activity   • Alcohol use: Yes     Alcohol/week: 16.0 standard drinks of alcohol     Types: 2 Glasses of wine, 14 Standard drinks or equivalent per week   • Drug use: Yes     Frequency: 7.0 times per week     Types: Cannabis     History   Drug Use   • Frequency: 7.0 times per week   • Types: Cannabis     Available pre-op labs reviewed.  Lab Results   Component Value Date    WBC 7.2 2024    RBC 3.85 2024    HGB 13.0 2024    HCT 37.3 2024    MCV 96.9 2024    MCH 33.8 2024    MCHC 34.9 2024    RDW 11.9 2024    .0 (L) 2024      Lab Results   Component Value Date     09/06/2024    K 4.0 09/06/2024     09/06/2024    CO2 22.0 09/06/2024    BUN <5 (L) 09/06/2024    CREATSERUM 0.65 09/06/2024     (H) 09/06/2024    CA 8.4 (L) 09/06/2024            Airway      Mallampati: II  Mouth opening: >3 FB  TM distance: > 6 cm  Neck ROM: full Cardiovascular      Rhythm: regular  Rate: normal     Dental             Pulmonary      Breath sounds clear to auscultation bilaterally.               Other findings        ASA: 3   Plan: MAC  NPO status verified and patient meets guidelines.    Post-procedure pain management plan discussed with surgeon and patient.      Plan/risks discussed with: patient            Present on Admission:  **None**             [1]   Medications Prior to Admission   Medication Sig Dispense Refill Last Dose/Taking   • rosuvastatin 20 MG Oral Tab Take 1 tablet (20 mg total) by mouth every morning.   11/18/2024   • sertraline 50 MG Oral Tab Take 0.5 tablets (25 mg total) by mouth daily.   11/18/2024   • VENTOLIN  (90 BASE) MCG/ACT Inhalation Aero Soln INHALE 2 PUFFS Q 4 H PRN  0 11/18/2024   • pantoprazole 40 MG Oral Tab EC Take one tablet (40 mg total) by mouth once daily, 30 minutes prior to breakfast. 90 tablet 3 More than a month

## 2024-11-19 NOTE — DISCHARGE INSTRUCTIONS
Home Care Instructions for Gastroscopy/Endoscopic Ultrasound/Endoscopic Retrograde Cholangiopancreatography with Sedation    Diet:  - Resume your regular diet as tolerated unless otherwise instructed.  - Start with light meals to minimize bloating.  - Do not drink alcohol today.    Medication:  - If you have questions about resuming your normal medications, please contact your Primary Care Physician.    Activities:  - Take it easy today. Do not return to work today.  - Do not drive today.  - Do not operate any machinery today (including kitchen equipment).    Gastroscopy/EUS/ERCP:  - You may have a sore throat for 2-3 days following the exam. This is normal. Gargling with warm salt water (1/2 tsp salt to 1 glass warm water) or using throat lozenges will help.  - If you experience any sharp pain in your neck, abdomen or chest, vomiting of blood, oral temperature over 100 degrees Fahrenheit, light-headedness or dizziness, or any other problems, contact your doctor.    **If unable to reach your doctor, please go to the [facility] Emergency Room**    - Your referring physician will receive a full report of your examination.  - If you do not hear from your doctor's office within two weeks of your biopsy, please call them for your results.

## 2024-11-19 NOTE — OPERATIVE REPORT
Su Dellaon Ace Patient Status:  Hospital Outpatient Surgery    1945 MRN [id number]   [facility] ENDOSCOPY [facility]IN [facility] Attending Elijah Mai MD   Date 2024 PCP Liliana Ybarra MD     PREOPERATIVE DIAGNOSIS/INDICATION: Distal bile duct lesion  POSTOPERTATIVE DIAGNOSIS: Irregular post sphincterotomy ampulla, biliary sludge  PROCEDURE PERFORMED: ERCP with biliary balloon sweep and biopsy  TIME OUT WAS PERFORMED     SEDATION: MAC sedation provided by General Anesthesia     INFORMED CONSENT: Risks, benefits and alternatives to the procedure were explained to the patient including but not limited to bleeding, infection, perforation, adverse drug reactions, pancreatitis and the need for hospitalization and surgery if this occurs, the patient understands and agrees to procedure.  PROCEDURE DESCRIPTION: The therapeutic side viewing duodenoscope was introduced into the patient’s mouth, hypo pharynx, esophagus, stomach and the first and second portion of the duodenum, straightening of the endoscope was performed to obtain a direct view of the major ampulla. Careful but limited examination of the above described areas was performed on withdrawal of the endoscope. The patient tolerated the procedure well and there were no immediate complications noted during the procedure, the patient was transported to the recovery area in stable condition.  FINDINGS:  DUODENUM: Normal  MAJOR AMPULLA:Post sphincterotomy changes and irregular fleshy mucosa  ERP: Not attempted  ERC: The CBD showed mild diffuse dilatation and sludge, the CHD, the confluence of the right and left hepatic ducts and the intrahepatics were mildly dilated, the cystic duct and the gallbladder were not visualized  THERAPEUTICS: The CBD was cannulated using a standard 0.035 [place] [employer] triple lumen balloon, deep cannulation was performed with the help of a 0.035 straight [employer] wire 260cm in length. Biliary balloon stone  extraction was performed the help of [employer]’s 9-12mm triple lumen stone extraction balloon, sludge was retrieved. Ampullary irregularity biopsies were performed with cold forceps  RECOMMENDATIONS:   Post ERCP precautions, watch for bleeding, infection, perforation, adverse drug reactions and pancreatitis.  Call status report post procedure.  Follow biopsies.    Elijah Mai MD  11/19/2024  12:22 PM

## 2024-11-19 NOTE — OPERATIVE REPORT
Su Christensen Ace Patient Status:  Hospital Outpatient Surgery    1945 MRN [id number]   [facility] ENDOSCOPY PAIN CENTER Attending Elijah Mai MD   Date 2024 PCP Liliana Ybarra MD     PREOPERATIVE DIAGNOSIS/INDICATION: H/o prior ERCP, dysphagia, esophageal stricture, distal CBD lesion, biliary sludge  POSTOPERTATIVE DIAGNOSIS: Same  PROCEDURE PERFORMED: EUS/EGD biopsy and esophageal balloon dilatation 16.5 mm  TIME OUT WAS PERFORMED    SEDATION: MAC sedation provided by General Anesthesia    INFORMED CONSENT: Risks, benefits and alternatives to the procedure were explained to the patient including but not limited to bleeding, infection, perforation, adverse drug reactions, pancreatitis and the need for hospitalization and surgery if this occurs, the patient understands and agrees to procedure.  PROCEDURE DESCRIPTION: The upper endoscope and later the therapeutic side viewing echoendoscope were introduced into the patient’s mouth, hypo pharynx, esophagus, stomach and the first and second portion of the duodenum, straightening of the endoscope was performed to obtain a direct view of the major ampulla, retroflexion was performed in the stomach with the EGD scope only. Careful examination of the above described areas was performed on withdrawal of the endoscope. The patient tolerated the procedure well and there were no immediate complications noted during the procedure, the patient was transported to the recovery area in stable condition.  FINDINGS:  ESOPHAGUS: Mid-distal esophageal strictures 15 mm in diameter, with diverticulosis  GEJ: Normal  STOMACH: Normal  DUODENUM: Normal  MAJOR AMPULLA: Post sphincterotomy changes, irregular mucosa  ECHO: Imaging was performed through the esophagus, stomach and duodenum. The visualized pancreatic parenchyma and the main PD at the head appear wnl, the biliary tree showed mild sludge  THERAPEUTICS: Cold forceps biopsy was performed from  esophagus and ampulla, Esophageal balloon dilatation through the scope with CRE fixed wire balloon size 15-16.5 mm mucosal inspection was performed after each dilatation diameter, superficial mucosal break noted only after 16.5 mm, no complications, perforation of bleeding noted  RECOMMENDATIONS:   Post EUS/EGD precautions, watch for bleeding, infection, perforation, adverse drug reactions and pancreatitis.  Call status report post procedure.  Follow biopsies.  Repeat EDG dilatation in 4 weeks    Elijah Mai MD  11/19/2024  12:17 PM

## 2024-11-19 NOTE — H&P
[facility]  Pre-op H and P    Su Christensen Ace Patient Status:  Hospital Outpatient Surgery    1945 MRN [id number]   Location [facility] ENDOSCOPY PAIN CENTER Attending Elijah Mai MD   Date 2024 PCP Liliana Ybarra MD     CC: chronic esophageal dysphagia with stricture 2/2 lymphocytic esophagitis who presents as hospital follow up after recent admission for right sided abdominal pain with dilation of CBD, intrahepatic ducts, and pancreatic ducts related to biliary sludge and distal CBD lesion with pathology revealing ulcerated biliary epithelium with underlying granulation tissue     History of Present Illness:  Su Christensen Ace is a a(n) 79 year old female. chronic esophageal dysphagia with stricture 2/2 lymphocytic esophagitis who presents as hospital follow up after recent admission for right sided abdominal pain with dilation of CBD, intrahepatic ducts, and pancreatic ducts related to biliary sludge and distal CBD lesion with pathology revealing ulcerated biliary epithelium with underlying granulation tissue     History:  Past Medical History:    Body piercing    Ears    Calculus of kidney    COPD (chronic obstructive pulmonary disease) (HCC)    Decorative tattoo    Depression    Felicia Swain virus infection    High cholesterol    Slight elevation    Painful swallowing    At times    Pneumonia due to organism    Problems with swallowing    Years ago    Sinus infection     Past Surgical History:   Procedure Laterality Date    Biopsy/removal, lymph node(s)      Cataract      David biopsy stereo nodule 1 site right (cpt=19081)      2015    Tonsillectomy      Upper gi endoscopy,exam       Family History   Problem Relation Age of Onset    Cancer Father     Heart Disorder Father     Other (Other) Mother     Stroke Mother     Diabetes Maternal Grandmother     Other (Other) Maternal Grandmother       reports that she quit smoking about 6 years ago. Her smoking use included cigarettes.  She has a 35 pack-year smoking history. She has never used smokeless tobacco. She reports current alcohol use of about 16.0 standard drinks of alcohol per week. She reports current drug use. Frequency: 7.00 times per week. Drug: Cannabis.    Allergies:  Allergies[1]    Medications:    Current Facility-Administered Medications:     lactated ringers infusion, , Intravenous, Continuous    lactated ringers IV bolus 1,000 mL, 1,000 mL, Intravenous, Once **FOLLOWED BY** lactated ringers infusion, 100 mL/hr, Intravenous, Continuous    indomethacin (Indocin) 100 MG rectal suppository 100 mg, 100 mg, Rectal, Once    Physical Exam:    Blood pressure 123/69, pulse 74, temperature 97.5 °F (36.4 °C), temperature source Temporal, resp. rate 18, height 5' 5\" (1.651 m), weight 157 lb (71.2 kg), SpO2 95%.    General: Appears alert, oriented x3 and in no acute distress.  CV: Normal rate   Lungs: Normal effort   Skin: Warm and dry.  Laboratory Data:       Imaging:      Assessment/Plan/Procedure:  Patient Active Problem List   Diagnosis    Ganglion of wrist, right    Pseudophakia of both eyes    Posterior vitreous detachment of both eyes    CARRIZALES (dyspnea on exertion)    Abscess of parotid gland    Acute parotitis    EBV (Felicia-Barr virus) viremia    Hypocomplementemia (HCC)    Hypogammaglobulinemia (HCC)    Esophageal dysphagia    Problems with swallowing and mastication    Acute cholecystitis    Calculus of gallbladder with acute cholecystitis and obstruction    Common bile duct (CBD) stricture (HCC)    Common bile duct dilation    Dilation of pancreatic duct (HCC)    Intrahepatic bile duct dilation    Generalized abdominal pain    Acute cystitis without hematuria    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)    Thrombocytopenia (HCC)       chronic esophageal dysphagia with stricture 2/2 lymphocytic esophagitis who presents as hospital follow up after recent admission for right sided abdominal pain with dilation of CBD,  intrahepatic ducts, and pancreatic ducts related to biliary sludge and distal CBD lesion with pathology revealing ulcerated biliary epithelium with underlying granulation tissue     Plan:  EGDEUS/ERCP    Elijah Mai MD  11/19/2024  11:23 AM       [1]   Allergies  Allergen Reactions    Augmentin [Amoxicillin-Pot Clavulanate] ANAPHYLAXIS    Penicillins ANAPHYLAXIS    Keflex [Cephalexin] RASH

## (undated) DEVICE — REM POLYHESIVE ADULT PATIENT RETURN ELECTRODE: Brand: VALLEYLAB

## (undated) DEVICE — BLADE ELECTROSURG 4IN INSULATE

## (undated) DEVICE — PREMIUM WET SKIN PREP TRAY: Brand: MEDLINE INDUSTRIES, INC.

## (undated) DEVICE — RETRIEVAL BALLOON CATHETER: Brand: EXTRACTOR™ PRO RX

## (undated) DEVICE — HEAD AND NECK CDS-LF: Brand: MEDLINE INDUSTRIES, INC.

## (undated) DEVICE — SNAPLOC WIRE GUIDE LOCKING DEVICE: Brand: SNAPLOC

## (undated) DEVICE — SYRINGE MED 10ML LL TIP W/O SFTY DISP

## (undated) DEVICE — GIJAW SINGLE-USE BIOPSY FORCEPS WITH NEEDLE: Brand: GIJAW

## (undated) DEVICE — KIT CUSTOM ENDOPROCEDURE STERIS

## (undated) DEVICE — HERCULES 3 STAGE BALLOON ESOPHAGEAL: Brand: HERCULES

## (undated) DEVICE — SUTURE ETHILON 3-0 FS-1

## (undated) DEVICE — 10FT COMBINED O2 DELIVERY/CO2 MONITORING. FILTER WITH MICROSTREAM TYPE LUER: Brand: DUAL ADULT NASAL CANNULA

## (undated) DEVICE — DEVICE INFL 60ML 15ATM PRSS COOK SPHR

## (undated) DEVICE — ACROBAT 2 CALIBRATED TIP WIRE GUIDE: Brand: ACROBAT

## (undated) DEVICE — BITEBLOCK ENDOSCP 60FR MAXI STRP

## (undated) DEVICE — BALLOON HEMOSTATIC EUS LINEAR

## (undated) DEVICE — STRL PENROSE DRAIN 18" X 1/4": Brand: CARDINAL HEALTH

## (undated) DEVICE — SINGLE USE DISTAL COVER MAJ-2315: Brand: SINGLE USE DISTAL COVER

## (undated) DEVICE — KIT VLV 5 PC AIR H2O SUCT BX ENDOGATOR CONN

## (undated) DEVICE — V2 SPECIMEN COLLECTION MANIFOLD KIT: Brand: NEPTUNE

## (undated) DEVICE — 3M™ RED DOT™ MONITORING ELECTRODE WITH FOAM TAPE AND STICKY GEL, 50/BAG, 20/CASE, 72/PLT 2570: Brand: RED DOT™

## (undated) DEVICE — 1200CC GUARDIAN II: Brand: GUARDIAN

## (undated) DEVICE — CANNULATING SPHINCTEROTOME: Brand: AUTOTOME™ RX 44

## (undated) DEVICE — KIT MFLD FOR SPEC COLL

## (undated) NOTE — LETTER
[address]  [address]  Authorization for Surgical Operation and Procedure     Date:___________                                                                                                         Time:__________  I hereby authorize Surgeon(s):  Viviana Mcguire MD, my physician and his/her assistants (if applicable), which may include medical students, residents, and/or fellows, to perform the following surgical operation/ procedure and administer such anesthesia as may be determined necessary by my physician:  Operation/Procedure name (s) Procedure(s):  LAPAROSCOPIC CHOLECYSTECTOMY WITH INTRAOPERATIVE CHOLANGIOGRAM on Su Christensen Ace   2.   I recognize that during the surgical operation/procedure, unforeseen conditions may necessitate additional or different procedures than those listed above.  I, therefore, further authorize and request that the above-named surgeon, assistants, or designees perform such procedures as are, in their judgment, necessary and desirable.    3.   My surgeon/physician has discussed prior to my surgery the potential benefits, risks and side effects of this procedure; the likelihood of achieving goals; and potential problems that might occur during recuperation.  They also discussed reasonable alternatives to the procedure, including risks, benefits, and side effects related to the alternatives and risks related to not receiving this procedure.  I have had all my questions answered and I acknowledge that no guarantee has been made as to the result that may be obtained.    4.   Should the need arise during my operation/procedure, which includes change of level of care prior to discharge, I also consent to the administration of blood and/or blood products.  Further, I understand that despite careful testing and screening of blood or blood products by collecting agencies, I may still be subject to ill effects as a result of receiving a blood  transfusion and/or blood products.  The following are some, but not all, of the potential risks that can occur: fever and allergic reactions, hemolytic reactions, transmission of diseases such as Hepatitis, AIDS and Cytomegalovirus (CMV) and fluid overload.  In the event that I wish to have an autologous transfusion of my own blood, or a directed donor transfusion, I will discuss this with my physician.  Check only if Refusing Blood or Blood Products  I understand refusal of blood or blood products as deemed necessary by my physician may have serious consequences to my condition to include possible death. I hereby assume responsibility for my refusal and release the hospital, its personnel, and my physicians from any responsibility for the consequences of my refusal.          o  Refuse      5.   I authorize the use of any specimen, organs, tissues, body parts or foreign objects that may be removed from my body during the operation/procedure for diagnosis, research or teaching purposes and their subsequent disposal by hospital authorities.  I also authorize the release of specimen test results and/or written reports to my treating physician on the hospital medical staff or other referring or consulting physicians involved in my care, at the discretion of the Pathologist or my treating physician.    6.   I consent to the photographing or videotaping of the operations or procedures to be performed, including appropriate portions of my body for medical, scientific, or educational purposes, provided my identity is not revealed by the pictures or by descriptive texts accompanying them.  If the procedure has been photographed/videotaped, the surgeon will obtain the original picture, image, videotape or CD.  The hospital will not be responsible for storage, release or maintenance of the picture, image, tape or CD.    7.   I consent to the presence of a  or observers in the operating room as deemed  necessary by my physician or their designees.    8.   I recognize that in the event my procedure results in extended X-Ray/fluoroscopy time, I may develop a skin reaction.    9. If I have a Do Not Attempt Resuscitation (DNAR) order in place, that status will be suspended while in the operating room, procedural suite, and during the recovery period unless otherwise explicitly stated by me (or a person authorized to consent on my behalf). The surgeon or my attending physician will determine when the applicable recovery period ends for purposes of reinstating the DNAR order.  10. Patients having a sterilization procedure: I understand that if the procedure is successful the results will be permanent and it will therefore be impossible for me to inseminate, conceive, or bear children.  I also understand that the procedure is intended to result in sterility, although the result has not been guaranteed.   11. I acknowledge that my physician has explained sedation/analgesia administration to me including the risk and benefits I consent to the administration of sedation/analgesia as may be necessary or desirable in the judgment of my physician.    I CERTIFY THAT I HAVE READ AND FULLY UNDERSTAND THE ABOVE CONSENT TO OPERATION and/or OTHER PROCEDURE.    _________________________________________  __________________________________  Signature of Patient     Signature of Responsible Person         ___________________________________         Printed Name of Responsible Person           _________________________________                 Relationship to Patient  _________________________________________  ______________________________  Signature of Witness          Date  Time      Patient Name: Su Christensen Ace     : 1945                 Printed: 2024     Medical Record #: [id number]                     Page 1 of 2                                    [address]   [address]    Consent for Anesthesia    I Su Christensen Ace agree to be cared for by an anesthesiologist, who is specially trained to monitor me and give me medicine to put me to sleep or keep me comfortable during my procedure    I understand that my anesthesiologist is not an employee or agent of [facility] or [employer]. He or she works for [employer].    As the patient asking for anesthesia services, I agree to:  Allow the anesthesiologist (anesthesia doctor) to give me medicine and do additional procedures as necessary. Some examples are: Starting or using an “IV” to give me medicine, fluids or blood during my procedure, and having a breathing tube placed to help me breathe when I’m asleep (intubation). In the event that my heart stops working properly, I understand that my anesthesiologist will make every effort to sustain my life, unless otherwise directed by [facility] Do Not Resuscitate documents.  Tell my anesthesia doctor before my procedure:  If I am pregnant.  The last time that I ate or drank.  All of the medicines I take (including prescriptions, herbal supplements, and pills I can buy without a prescription (including street drugs/illegal medications). Failure to inform my anesthesiologist about these medicines may increase my risk of anesthetic complications.  If I am allergic to anything or have had a reaction to anesthesia before.  I understand how the anesthesia medicine will help me (benefits).  I understand that with any type of anesthesia medicine there are risks:  The most common risks are: nausea, vomiting, sore throat, muscle soreness, damage to my eyes, mouth, or teeth (from breathing tube placement).  Rare risks include: remembering what happened during my procedure, allergic reactions to medications, injury to my airway, heart, lungs, vision, nerves, or muscles and in extremely rare instances death.  My doctor has explained to me other choices  available to me for my care (alternatives).  Pregnant Patients (“epidural”):  I understand that the risks of having an epidural (medicine given into my back to help control pain during labor), include itching, low blood pressure, difficulty urinating, headache or slowing of the baby’s heart. Very rare risks include infection, bleeding, seizure, irregular heart rhythms and nerve injury.  Regional Anesthesia (“spinal”, “epidural”, & “nerve blocks”):  I understand that rare but potential complications include headache, bleeding, infection, seizure, irregular heart rhythms, and nerve injury.    I can change my mind about having anesthesia services at any time before I get the medicine.    _____________________________________________________________________________  Patient (or Representative) Signature/Relationship to Patient  Date   Time    _____________________________________________________________________________   Name (if used)    Language/Organization   Time    _____________________________________________________________________________  Anesthesiologist Signature     Date   Time  I have discussed the procedure and information above with the patient (or patient’s representative) and answered their questions. The patient or their representative has agreed to have anesthesia services.    _____________________________________________________________________________  Witness        Date   Time  I have verified that the signature is that of the patient or patient’s representative, and that it was signed before the procedure  Patient Name: Su Christensen Ace     : 1945                 Printed: 2024     Medical Record #: [id number]                     Page 2 of 2

## (undated) NOTE — LETTER
Brianne Martinez [address]  [place], [address] [facility]    Consent for Operation  Date: __________________                                Time: _______________    1.  I authorize the performance upon Verena Torres the following operation:  Procedur procedure has been videotaped, the surgeon will obtain the original videotape. The hospital will not be responsible for storage or maintenance of this tape.   7. For the purpose of advancing medical education, I consent to the admittance of observers to the STATEMENTS REQUIRING INSERTION OR COMPLETION WERE FILLED IN.     Signature of Patient:   ___________________________    When the patient is a minor or mentally incompetent to give consent:  Signature of person authorized to consent for patient: ____________ supplements, and pills I can buy without a prescription (including street drugs/illegal medications). Failure to inform my anesthesiologist about these medicines may increase my risk of anesthetic complications. iv.  If I am allergic to anything or have ha Anesthesiologist Signature     Date   Time  I have discussed the procedure and information above with the patient (or patient’s representative) and answered their questions. The patient or their representative has agreed to have anesthesia services.     ___

## (undated) NOTE — LETTER
[address]  [address]  Authorization for Surgical Operation and Procedure     Date:___________                                                                                                         Time:__________  I hereby authorize Surgeon(s):  Viviana Mcguire MD, my physician and his/her assistants (if applicable), which may include medical students, residents, and/or fellows, to perform the following surgical operation/ procedure and administer such anesthesia as may be determined necessary by my physician:  Operation/Procedure name (s) laparoscopic cholecystectomy, possible open on Su Christensen Ace   2.   I recognize that during the surgical operation/procedure, unforeseen conditions may necessitate additional or different procedures than those listed above.  I, therefore, further authorize and request that the above-named surgeon, assistants, or designees perform such procedures as are, in their judgment, necessary and desirable.    3.   My surgeon/physician has discussed prior to my surgery the potential benefits, risks and side effects of this procedure; the likelihood of achieving goals; and potential problems that might occur during recuperation.  They also discussed reasonable alternatives to the procedure, including risks, benefits, and side effects related to the alternatives and risks related to not receiving this procedure.  I have had all my questions answered and I acknowledge that no guarantee has been made as to the result that may be obtained.    4.   Should the need arise during my operation/procedure, which includes change of level of care prior to discharge, I also consent to the administration of blood and/or blood products.  Further, I understand that despite careful testing and screening of blood or blood products by collecting agencies, I may still be subject to ill effects as a result of receiving a blood transfusion and/or blood products.  The  following are some, but not all, of the potential risks that can occur: fever and allergic reactions, hemolytic reactions, transmission of diseases such as Hepatitis, AIDS and Cytomegalovirus (CMV) and fluid overload.  In the event that I wish to have an autologous transfusion of my own blood, or a directed donor transfusion, I will discuss this with my physician.  Check only if Refusing Blood or Blood Products  I understand refusal of blood or blood products as deemed necessary by my physician may have serious consequences to my condition to include possible death. I hereby assume responsibility for my refusal and release the hospital, its personnel, and my physicians from any responsibility for the consequences of my refusal.          o  Refuse      5.   I authorize the use of any specimen, organs, tissues, body parts or foreign objects that may be removed from my body during the operation/procedure for diagnosis, research or teaching purposes and their subsequent disposal by hospital authorities.  I also authorize the release of specimen test results and/or written reports to my treating physician on the hospital medical staff or other referring or consulting physicians involved in my care, at the discretion of the Pathologist or my treating physician.    6.   I consent to the photographing or videotaping of the operations or procedures to be performed, including appropriate portions of my body for medical, scientific, or educational purposes, provided my identity is not revealed by the pictures or by descriptive texts accompanying them.  If the procedure has been photographed/videotaped, the surgeon will obtain the original picture, image, videotape or CD.  The hospital will not be responsible for storage, release or maintenance of the picture, image, tape or CD.    7.   I consent to the presence of a  or observers in the operating room as deemed necessary by my physician or their designees.     8.   I recognize that in the event my procedure results in extended X-Ray/fluoroscopy time, I may develop a skin reaction.    9. If I have a Do Not Attempt Resuscitation (DNAR) order in place, that status will be suspended while in the operating room, procedural suite, and during the recovery period unless otherwise explicitly stated by me (or a person authorized to consent on my behalf). The surgeon or my attending physician will determine when the applicable recovery period ends for purposes of reinstating the DNAR order.  10. Patients having a sterilization procedure: I understand that if the procedure is successful the results will be permanent and it will therefore be impossible for me to inseminate, conceive, or bear children.  I also understand that the procedure is intended to result in sterility, although the result has not been guaranteed.   11. I acknowledge that my physician has explained sedation/analgesia administration to me including the risk and benefits I consent to the administration of sedation/analgesia as may be necessary or desirable in the judgment of my physician.    I CERTIFY THAT I HAVE READ AND FULLY UNDERSTAND THE ABOVE CONSENT TO OPERATION and/or OTHER PROCEDURE.    _________________________________________  __________________________________  Signature of Patient     Signature of Responsible Person         ___________________________________         Printed Name of Responsible Person           _________________________________                 Relationship to Patient  _________________________________________  ______________________________  Signature of Witness          Date  Time      Patient Name: Su Christensen Ace     : 1945                 Printed: 2024     Medical Record #: [id number]                     Page 1 of [address] [place] [address] [place]  [address]    Consent for Anesthesia    I, Su Christensen Ace  agree to be cared for by an anesthesiologist, who is specially trained to monitor me and give me medicine to put me to sleep or keep me comfortable during my procedure    I understand that my anesthesiologist is not an employee or agent of [facility] or [employer]. He or she works for [employer].    As the patient asking for anesthesia services, I agree to:  Allow the anesthesiologist (anesthesia doctor) to give me medicine and do additional procedures as necessary. Some examples are: Starting or using an “IV” to give me medicine, fluids or blood during my procedure, and having a breathing tube placed to help me breathe when I’m asleep (intubation). In the event that my heart stops working properly, I understand that my anesthesiologist will make every effort to sustain my life, unless otherwise directed by [facility] Do Not Resuscitate documents.  Tell my anesthesia doctor before my procedure:  If I am pregnant.  The last time that I ate or drank.  All of the medicines I take (including prescriptions, herbal supplements, and pills I can buy without a prescription (including street drugs/illegal medications). Failure to inform my anesthesiologist about these medicines may increase my risk of anesthetic complications.  If I am allergic to anything or have had a reaction to anesthesia before.  I understand how the anesthesia medicine will help me (benefits).  I understand that with any type of anesthesia medicine there are risks:  The most common risks are: nausea, vomiting, sore throat, muscle soreness, damage to my eyes, mouth, or teeth (from breathing tube placement).  Rare risks include: remembering what happened during my procedure, allergic reactions to medications, injury to my airway, heart, lungs, vision, nerves, or muscles and in extremely rare instances death.  My doctor has explained to me other choices available to me for my care (alternatives).  Pregnant Patients  (“epidural”):  I understand that the risks of having an epidural (medicine given into my back to help control pain during labor), include itching, low blood pressure, difficulty urinating, headache or slowing of the baby’s heart. Very rare risks include infection, bleeding, seizure, irregular heart rhythms and nerve injury.  Regional Anesthesia (“spinal”, “epidural”, & “nerve blocks”):  I understand that rare but potential complications include headache, bleeding, infection, seizure, irregular heart rhythms, and nerve injury.    I can change my mind about having anesthesia services at any time before I get the medicine.    _____________________________________________________________________________  Patient (or Representative) Signature/Relationship to Patient  Date   Time    _____________________________________________________________________________   Name (if used)    Language/Organization   Time    _____________________________________________________________________________  Anesthesiologist Signature     Date   Time  I have discussed the procedure and information above with the patient (or patient’s representative) and answered their questions. The patient or their representative has agreed to have anesthesia services.    _____________________________________________________________________________  Witness        Date   Time  I have verified that the signature is that of the patient or patient’s representative, and that it was signed before the procedure  Patient Name: Su Christensen Ace     : 1945                 Printed: 2024     Medical Record #: [id number]                     Page 2 of 2